# Patient Record
Sex: MALE | Race: ASIAN | NOT HISPANIC OR LATINO | URBAN - METROPOLITAN AREA
[De-identification: names, ages, dates, MRNs, and addresses within clinical notes are randomized per-mention and may not be internally consistent; named-entity substitution may affect disease eponyms.]

---

## 2017-01-15 ENCOUNTER — EMERGENCY (EMERGENCY)
Facility: HOSPITAL | Age: 37
LOS: 1 days | Discharge: HOME | End: 2017-01-15
Attending: EMERGENCY MEDICINE | Admitting: EMERGENCY MEDICINE
Payer: COMMERCIAL

## 2017-01-15 ENCOUNTER — FORM ENCOUNTER (OUTPATIENT)
Age: 37
End: 2017-01-15

## 2017-01-15 VITALS
DIASTOLIC BLOOD PRESSURE: 105 MMHG | SYSTOLIC BLOOD PRESSURE: 146 MMHG | TEMPERATURE: 98 F | OXYGEN SATURATION: 99 % | HEART RATE: 82 BPM | RESPIRATION RATE: 18 BRPM

## 2017-01-15 PROCEDURE — 99053 MED SERV 10PM-8AM 24 HR FAC: CPT

## 2017-01-15 PROCEDURE — 99283 EMERGENCY DEPT VISIT LOW MDM: CPT | Mod: 25

## 2017-01-15 RX ORDER — OXYCODONE HYDROCHLORIDE 5 MG/1
1 TABLET ORAL
Qty: 9 | Refills: 0 | OUTPATIENT
Start: 2017-01-15 | End: 2017-01-18

## 2017-01-15 NOTE — ED PROVIDER NOTE - PHYSICAL EXAMINATION
base of 2nd left toe, + tenderness, mild inflammation, no warmth, minimal erythema, full rom, + able to bear weight

## 2017-01-15 NOTE — ED PROVIDER NOTE - ATTENDING CONTRIBUTION TO CARE
mary ellen DR. SHARP, ATTENDING MD-  I performed a face to face bedside interview with patient regarding history of present illness, review of symptoms and past medical history. I completed an independent physical exam.  I have discussed patient's plan of care with PA.   Documentation as above in the note.   HPI: 37 yo M with Left foot pain at 2 MCP. Has had this before, worry for infection although denies trauma, open wound, fevers, chills, nausea, vomiting, erythema, tachycardia. Pt works as a  and wears sneakers to work. No work boots  EXAM: Left foot plantar without open wounds, no wounds in between toes. No erythema, no warmth, no streaking, no ecchymosis or other signs trauma. Pulses palpable in LLE.   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 Rec pt to wear proper work boots. Will give hard sole shoe here and send to f/u with Podiatry. Rx for pain meds now as needed. RICE therapy as well. Return for signs infection. pt requesting Abx for this condition but explained not indicated at this time but if worsening symptoms or systemic symptoms arise may return and will provide Abx if appropriate then.

## 2017-01-15 NOTE — ED PROVIDER NOTE - PMH
Primary angle-closure glaucoma, unspecified glaucoma stage, unspecified laterality, unspecified primary angle-closure glaucoma type

## 2017-01-15 NOTE — ED PROVIDER NOTE - OBJECTIVE STATEMENT
37 yo male with a PMH of glaucoma and left 2nd MTP fracture presents with complaints of 8/10 sharp pain, redness and swelling of and under his left 2nd MTP that began suddenly after shoveling snow 3 days ago. States he fractured his left 2nd MTP in 2013 after stepping on a nail. In 2015 he experienced similar symptoms as today, CT scan done by foot doctor revealed a stress fracture that improved with IV antibiotics. Pt has tried Epsom salt with mild relief and has not taken any pain medications. Flexing 1st and 2nd MTP of left foot worsens pain. Denies fevers, chills, n/v/d, chest pain, sob, abdominal pain, urinary symptoms, other arthralgias, numbness, any known triggers or any other acute complaints or abnormalities.Pt does not want any pain meds.

## 2017-01-18 ENCOUNTER — FORM ENCOUNTER (OUTPATIENT)
Age: 37
End: 2017-01-18

## 2017-01-19 ENCOUNTER — EMERGENCY (EMERGENCY)
Facility: HOSPITAL | Age: 37
LOS: 1 days | Discharge: HOME | End: 2017-01-19
Attending: EMERGENCY MEDICINE | Admitting: EMERGENCY MEDICINE
Payer: COMMERCIAL

## 2017-01-19 VITALS
OXYGEN SATURATION: 99 % | TEMPERATURE: 98 F | RESPIRATION RATE: 16 BRPM | DIASTOLIC BLOOD PRESSURE: 95 MMHG | HEART RATE: 75 BPM | SYSTOLIC BLOOD PRESSURE: 133 MMHG

## 2017-01-19 NOTE — ED ADULT TRIAGE NOTE - CHIEF COMPLAINT QUOTE
pt amb to triage c/o L foot pain, recent ED eval for same w/ pt d/c'd, VSS, instructions given, SL removed, ambulatory. DP to podiatry, went for MRI of L foot today, presents stating increased pain, unable to tolerate, as per pt told to come to ED for increased pain

## 2017-01-20 LAB
ALBUMIN SERPL ELPH-MCNC: 4.4 G/DL — SIGNIFICANT CHANGE UP (ref 3.3–5)
ALP SERPL-CCNC: 57 U/L — SIGNIFICANT CHANGE UP (ref 40–120)
ALT FLD-CCNC: 29 U/L — SIGNIFICANT CHANGE UP (ref 4–41)
AST SERPL-CCNC: 17 U/L — SIGNIFICANT CHANGE UP (ref 4–40)
BASOPHILS # BLD AUTO: 0.02 K/UL — SIGNIFICANT CHANGE UP (ref 0–0.2)
BASOPHILS NFR BLD AUTO: 0.2 % — SIGNIFICANT CHANGE UP (ref 0–2)
BILIRUB SERPL-MCNC: 0.2 MG/DL — SIGNIFICANT CHANGE UP (ref 0.2–1.2)
BUN SERPL-MCNC: 21 MG/DL — SIGNIFICANT CHANGE UP (ref 7–23)
CALCIUM SERPL-MCNC: 10 MG/DL — SIGNIFICANT CHANGE UP (ref 8.4–10.5)
CHLORIDE SERPL-SCNC: 101 MMOL/L — SIGNIFICANT CHANGE UP (ref 98–107)
CO2 SERPL-SCNC: 27 MMOL/L — SIGNIFICANT CHANGE UP (ref 22–31)
CREAT SERPL-MCNC: 1.05 MG/DL — SIGNIFICANT CHANGE UP (ref 0.5–1.3)
CRP SERPL-MCNC: 9.7 MG/L — HIGH (ref 0.3–5)
EOSINOPHIL # BLD AUTO: 0.28 K/UL — SIGNIFICANT CHANGE UP (ref 0–0.5)
EOSINOPHIL NFR BLD AUTO: 3.4 % — SIGNIFICANT CHANGE UP (ref 0–6)
ERYTHROCYTE [SEDIMENTATION RATE] IN BLOOD: 8 MM/HR — SIGNIFICANT CHANGE UP (ref 1–15)
GLUCOSE SERPL-MCNC: 104 MG/DL — HIGH (ref 70–99)
HBA1C BLD-MCNC: 5.9 % — HIGH (ref 4–5.6)
HCT VFR BLD CALC: 45.9 % — SIGNIFICANT CHANGE UP (ref 39–50)
HGB BLD-MCNC: 15.2 G/DL — SIGNIFICANT CHANGE UP (ref 13–17)
IMM GRANULOCYTES NFR BLD AUTO: 0.1 % — SIGNIFICANT CHANGE UP (ref 0–1.5)
LYMPHOCYTES # BLD AUTO: 2.42 K/UL — SIGNIFICANT CHANGE UP (ref 1–3.3)
LYMPHOCYTES # BLD AUTO: 29.3 % — SIGNIFICANT CHANGE UP (ref 13–44)
MCHC RBC-ENTMCNC: 28 PG — SIGNIFICANT CHANGE UP (ref 27–34)
MCHC RBC-ENTMCNC: 33.1 % — SIGNIFICANT CHANGE UP (ref 32–36)
MCV RBC AUTO: 84.5 FL — SIGNIFICANT CHANGE UP (ref 80–100)
MONOCYTES # BLD AUTO: 0.51 K/UL — SIGNIFICANT CHANGE UP (ref 0–0.9)
MONOCYTES NFR BLD AUTO: 6.2 % — SIGNIFICANT CHANGE UP (ref 2–14)
NEUTROPHILS # BLD AUTO: 5.02 K/UL — SIGNIFICANT CHANGE UP (ref 1.8–7.4)
NEUTROPHILS NFR BLD AUTO: 60.8 % — SIGNIFICANT CHANGE UP (ref 43–77)
PLATELET # BLD AUTO: 179 K/UL — SIGNIFICANT CHANGE UP (ref 150–400)
PMV BLD: 11.5 FL — SIGNIFICANT CHANGE UP (ref 7–13)
POTASSIUM SERPL-MCNC: 4.4 MMOL/L — SIGNIFICANT CHANGE UP (ref 3.5–5.3)
POTASSIUM SERPL-SCNC: 4.4 MMOL/L — SIGNIFICANT CHANGE UP (ref 3.5–5.3)
PROT SERPL-MCNC: 7.4 G/DL — SIGNIFICANT CHANGE UP (ref 6–8.3)
RBC # BLD: 5.43 M/UL — SIGNIFICANT CHANGE UP (ref 4.2–5.8)
RBC # FLD: 13.8 % — SIGNIFICANT CHANGE UP (ref 10.3–14.5)
SODIUM SERPL-SCNC: 141 MMOL/L — SIGNIFICANT CHANGE UP (ref 135–145)
WBC # BLD: 8.26 K/UL — SIGNIFICANT CHANGE UP (ref 3.8–10.5)
WBC # FLD AUTO: 8.26 K/UL — SIGNIFICANT CHANGE UP (ref 3.8–10.5)

## 2017-01-20 PROCEDURE — 76882 US LMTD JT/FCL EVL NVASC XTR: CPT | Mod: 26,LT

## 2017-01-20 PROCEDURE — 73719 MRI LOWER EXTREMITY W/DYE: CPT | Mod: 26,LT

## 2017-01-20 PROCEDURE — 73630 X-RAY EXAM OF FOOT: CPT | Mod: 26,LT

## 2017-01-20 PROCEDURE — 99220: CPT

## 2017-01-20 RX ORDER — IBUPROFEN 200 MG
800 TABLET ORAL EVERY 6 HOURS
Qty: 0 | Refills: 0 | Status: DISCONTINUED | OUTPATIENT
Start: 2017-01-20 | End: 2017-01-23

## 2017-01-20 RX ORDER — LATANOPROST 0.05 MG/ML
1 SOLUTION/ DROPS OPHTHALMIC; TOPICAL ONCE
Qty: 0 | Refills: 0 | Status: COMPLETED | OUTPATIENT
Start: 2017-01-20 | End: 2017-01-20

## 2017-01-20 RX ORDER — IBUPROFEN 200 MG
400 TABLET ORAL EVERY 6 HOURS
Qty: 0 | Refills: 0 | Status: DISCONTINUED | OUTPATIENT
Start: 2017-01-20 | End: 2017-01-20

## 2017-01-20 RX ORDER — LATANOPROST 0.05 MG/ML
1 SOLUTION/ DROPS OPHTHALMIC; TOPICAL AT BEDTIME
Qty: 0 | Refills: 0 | Status: DISCONTINUED | OUTPATIENT
Start: 2017-01-20 | End: 2017-01-23

## 2017-01-20 RX ORDER — IBUPROFEN 200 MG
400 TABLET ORAL ONCE
Qty: 0 | Refills: 0 | Status: COMPLETED | OUTPATIENT
Start: 2017-01-20 | End: 2017-01-20

## 2017-01-20 RX ADMIN — Medication 100 MILLIGRAM(S): at 10:07

## 2017-01-20 RX ADMIN — Medication 400 MILLIGRAM(S): at 07:45

## 2017-01-20 RX ADMIN — Medication 400 MILLIGRAM(S): at 03:46

## 2017-01-20 RX ADMIN — LATANOPROST 1 DROP(S): 0.05 SOLUTION/ DROPS OPHTHALMIC; TOPICAL at 03:28

## 2017-01-20 RX ADMIN — Medication 400 MILLIGRAM(S): at 02:52

## 2017-01-20 RX ADMIN — Medication 400 MILLIGRAM(S): at 12:28

## 2017-01-20 RX ADMIN — Medication 400 MILLIGRAM(S): at 19:28

## 2017-01-20 RX ADMIN — Medication 100 MILLIGRAM(S): at 02:53

## 2017-01-20 RX ADMIN — Medication 400 MILLIGRAM(S): at 13:00

## 2017-01-20 RX ADMIN — Medication 400 MILLIGRAM(S): at 08:30

## 2017-01-20 RX ADMIN — Medication 100 MILLIGRAM(S): at 18:32

## 2017-01-20 RX ADMIN — Medication 400 MILLIGRAM(S): at 18:32

## 2017-01-20 NOTE — ED PROVIDER NOTE - CONSTITUTIONAL, MLM
normal... Well appearing, well nourished, awake, alert, oriented to person, place, time/situation and in no apparent distress.  Pt. verbalizing in full clear effortless sentences.

## 2017-01-20 NOTE — ED CDU PROVIDER NOTE - OBJECTIVE STATEMENT
37 yo male with PMH of glaucoma (on latanoprost gtts), hyperlipidemia (no meds), and remote hx/o left foot infections (2011, and 2013); presents to the ED for acute atraumatic left foot pain / swelling / erythema x past 2 days.  Pt. states he was in the ED on 1/15/17, was evaluated and d/c'd with instructions to f/u with a podiatrist.  Pt. has been taking ibuprofen 400 mg prn (was initially helping) and followed up with podiatrist Dr. Miles Blackwell, who placed pt in a walking boot and sent pt for outpatient MRI.  Pt. had MRI 1/19/17, but in interim from seeing podiatrist to now, new erythema/swelling developed along with worsening pain; ibuprofen (last dose 1/19 @ 9 pm) now no longer helping for pain.  Ambulation causes increased pain.  Pt. denies fevers / chills / other leg pain / calf pain / hx/o trauma or injury / rash or puncture or wound to skin.  No other c/o. 37 yo male with PMH of glaucoma (on latanoprost gtts), hyperlipidemia (no meds), and remote hx/o left foot infections (2011, and 2013); presents to the ED for acute atraumatic left foot pain / swelling / erythema x past 2 days.  Pt. states he was in the ED on 1/15/17, was evaluated and d/c'd with instructions to f/u with a podiatrist.  Pt. has been taking ibuprofen 400 mg prn (was initially helping) and followed up with podiatrist Dr. Miles Blackwell, who placed pt in a walking boot and sent pt for outpatient MRI.  Pt. had MRI 1/19/17, but in interim from seeing podiatrist to now, new erythema/swelling developed along with worsening pain; ibuprofen (last dose 1/19 @ 9 pm) now no longer helping for pain.  Ambulation causes increased pain.  Pt. denies fevers / chills / other leg pain / calf pain / hx/o trauma or injury / rash or puncture or wound to skin.  No other c/o.  CDU plan discussed with pt; pt verbalizes agreement with plan.    Podiatrist:  Dr. Miles Blackwell  [phone]  (Outpatient MRI next door to podiatrist's office.)

## 2017-01-20 NOTE — ED PROVIDER NOTE - MUSCULOSKELETAL MINIMAL EXAM
atraumatic/Left foot dorsum with mild swelling to distal dorsum of foot (centrally present with most focal area of swelling just proximal to left 2nd - 3rd toes.  Pt has limited ROM to toes due to pain; +NVI.  Skin to left foot is intact; there is no rash / lesion / puncture / abrasion / wound.  There is no objectively noted area of fluctuance.  There is no crepitus or induration of the skin.  Cap refill is brisk; pulses 2+.  Remainder of LLE is nontender, and without edema / erythema / tenderness.  Bilateral lower extremities are otherwise without edema.

## 2017-01-20 NOTE — ED CDU PROVIDER NOTE - PHYSICAL EXAMINATION
(Of note, I am the CDU PA for the 1/19 - 1/20/17 overnight.  I assisted in Intake and saw this pt who was ideal for CDU as he required additional eval / tx.  ED Provider Note as authored by me was copied to CDU Note, as same provider eval.)

## 2017-01-20 NOTE — ED PROVIDER NOTE - MEDICAL DECISION MAKING DETAILS
35 yo male, with acute left foot pain / swelling / erythema, concern for cellulitis r/o abscess.  Podiatry to see pt; consult called.  Labs / xray / US left foot ordered / analgesia prn.

## 2017-01-20 NOTE — ED PROVIDER NOTE - PMH
Hyperlipidemia    Primary angle-closure glaucoma, unspecified glaucoma stage, unspecified laterality, unspecified primary angle-closure glaucoma type

## 2017-01-20 NOTE — ED ADULT NURSE NOTE - OBJECTIVE STATEMENT
Patient received in intake #1 c/o left foot pain. A&Ox3. Patient reports stepping on a nail with his left foot in 2013 and reports that it has not healed well since then. Patient c/o swelling and pain to left foot. 20G Saline lock to right ac. Will monitor.

## 2017-01-20 NOTE — ED CDU PROVIDER NOTE - INDICATION FOR OBSERVATION
Other/IV antibiotics / meds per orders, analgesia prn, MRI as ordered, observation and reassessment.

## 2017-01-20 NOTE — ED CDU PROVIDER NOTE - ATTENDING CONTRIBUTION TO CARE
Cho Dr. Amato: I performed a face to face bedside interview with patient regarding history of present illness, review of symptoms and past medical history. I completed an independent physical exam.  I have discussed patient's plan of care with PA.   I agree with note as stated above, having amended the EMR as needed to reflect my findings.   This includes HISTORY OF PRESENT ILLNESS, HIV, PAST MEDICAL/SURGICAL/FAMILY/SOCIAL HISTORY, ALLERGIES AND HOME MEDICATIONS, REVIEW OF SYSTEMS, PHYSICAL EXAM, and any PROGRESS NOTES during the time I functioned as the attending physician for this patient.  36M p/w atraumatic swelling to L foot, no fevers or chills, had h/o fx to foot in the past. On exam pt has erythema and warmth over dorsum of foot with no crepitus or ulcers. Plan - IV abx, MRI, podiatry consult.

## 2017-01-20 NOTE — ED CDU PROVIDER NOTE - PLAN OF CARE
Resolution of infection Follow up with your PMD within 3-5 days. Call for an appointment.  Bring copies of your ER lab reports with you to MD appointment. Also follow up with podiatry within 2-3 days.  Call for an appointment. Stay hydrated and get plenty of rest. take medications as prescribed. Take Motrin 600mg every 8 hours as needed for pain. Take Clindamycin 300mg every 8 hours until finished.  Return to ER if symptoms return or worsen, severe pain,  high fever, increased swelling or redness, difficulty ambulating or if other concerns arise. Of note your A1C level was elevated. This will need to be followed with your PMD. Avoid food high in sugar and carbohydrates. read the handouts provided.

## 2017-01-20 NOTE — ED PROVIDER NOTE - OBJECTIVE STATEMENT
35 yo male with PMH of glaucoma (on latanoprost gtts), hyperlipidemia (no meds), and remote hx/o left foot infections (2011, and 2013); presents to the ED for acute atraumatic left foot pain / swelling / erythema x past 2 days.  Pt. states he was in the ED on 1/15/17, was evaluated and d/c'd with instructions to f/u with a podiatrist.  Pt. has been taking ibuprofen 400 mg prn (was initially helping) and followed up with podiatrist Dr. Miles Blackwell, who placed pt in a walking boot and sent pt for outpatient MRI.  Pt. had MRI 1/19/17, but in interim from seeing podiatrist to now, new erythema/swelling developed along with worsening pain; ibuprofen (last dose 1/19 @ 9 pm) now no longer helping for pain.  Ambulation causes increased pain.  Pt. denies fevers / chills / other leg pain / calf pain / hx/o trauma or injury / rash or puncture or wound to skin.  No other c/o.

## 2017-01-20 NOTE — ED CDU PROVIDER NOTE - PROGRESS NOTE DETAILS
KARLEE Garnett Addendum----  Pt. states his pain is improving with oral analgesia provided in Intake area of ED. CDU PA Tamir Addendum----  Pt. resting comfortably in interim; no issues to date.  Pt. awaiting MRI; US and xray of left foot officially read by radiology; US showed no fluid collection; xray showed no evidence of osteomyelitis.  Pt. sleeping at present; will continue to monitor / reassess.  Pt. will be signed out to CDU day PA and attending at 0700 hrs. Dr. Amato: Pt doing well, however area of erythema over dorsum of midfoot (left) extending minimally beyond area of demarcation from yesterday. Pt denies fevers or chills. Normal active and passive ROM of ankle, septic arthritis unlikely. Will continue IV clinda, f/u MRI r/o osteo, f/u podiatry, reassess. CDU BREN Garnett Addendum------  This patient was signed out to me by CDU Brian Boyce and CDU attending Dr. Amato on 01/20/17 at 1900 hrs; test results reviewed.  In interim, pt has been resting comfortably; no complaints in interim apart from left foot pain (same areas as prior) after MRI; pt states he had his foot in an uncomfortable position during the test.  Area of edema does not appear much different from my last exam earlier today; erythema does appear somewhat improved.  Toes mobile and warm; remainder of LLE exam unremarkable; no calf TTP; +NVI.  Pt stable at present; will continue to monitor / reassess. CDU PA Garnett Addendum----  Pt. resting comfortably in interim; no issues to date; awaiting official MRI results; will continue to monitor / reassess.  Pt. will be signed out to CDU day PA and attending at 0700 hrs. CDU BREN Bryan -  Patient states he still has some pain in his foot. States he feels it is 60% better. Exam: heart- RRR s1 s2 nl Lungs - clear bilaterally  abd - soft NT ND  extrem- no cyanosis. left foot with swelling noted along dorsal aspect foot near second and third toes. swelling also seen of posterior foot below toes.  Labs: cbc - wbc 6.7 hgb 15.0 hct 44.8 plt 175  bmp - Na 139 K 4.3 cl 100 co2 25 BUN 22 creat 1.0 gluc 86 CRP 16.9   Plan for podiatry follow up this am. To continue with Clindamycin antibiotic, MRI completed overnight - await results. Continue to monitor. CDU DISCHARGE NOTE -  Attending : Patient had uneventful stay in CDU.   MRI completed.  L foot swelling and redness decreased.  Able to bear weight on it.   VSS:  Lungs CTA b/l, CV: RR , Ext: no pedal edema, Neuro AOx3 , nonfocal.  L foot dorsum with redness and swelling decreased from demarcated. Toes 2-3-4 mildly swollen and 3rd toe decr AROM.  Awaiting MRI report and podiatry reassessment.  Stable for discharge.  Labs and tests reviewed with the patient and copies provided. The patient is to follow up with their doctor as discussed.

## 2017-01-21 VITALS
SYSTOLIC BLOOD PRESSURE: 122 MMHG | OXYGEN SATURATION: 98 % | HEART RATE: 72 BPM | RESPIRATION RATE: 17 BRPM | DIASTOLIC BLOOD PRESSURE: 81 MMHG | TEMPERATURE: 98 F

## 2017-01-21 LAB
BASOPHILS # BLD AUTO: 0.03 K/UL — SIGNIFICANT CHANGE UP (ref 0–0.2)
BASOPHILS NFR BLD AUTO: 0.4 % — SIGNIFICANT CHANGE UP (ref 0–2)
BUN SERPL-MCNC: 22 MG/DL — SIGNIFICANT CHANGE UP (ref 7–23)
CALCIUM SERPL-MCNC: 9.4 MG/DL — SIGNIFICANT CHANGE UP (ref 8.4–10.5)
CHLORIDE SERPL-SCNC: 100 MMOL/L — SIGNIFICANT CHANGE UP (ref 98–107)
CO2 SERPL-SCNC: 25 MMOL/L — SIGNIFICANT CHANGE UP (ref 22–31)
CREAT SERPL-MCNC: 1.08 MG/DL — SIGNIFICANT CHANGE UP (ref 0.5–1.3)
CRP SERPL-MCNC: 16.9 MG/L — HIGH (ref 0.3–5)
EOSINOPHIL # BLD AUTO: 0.36 K/UL — SIGNIFICANT CHANGE UP (ref 0–0.5)
EOSINOPHIL NFR BLD AUTO: 5.3 % — SIGNIFICANT CHANGE UP (ref 0–6)
ERYTHROCYTE [SEDIMENTATION RATE] IN BLOOD: 9 MM/HR — SIGNIFICANT CHANGE UP (ref 1–15)
GLUCOSE SERPL-MCNC: 86 MG/DL — SIGNIFICANT CHANGE UP (ref 70–99)
HCT VFR BLD CALC: 44.8 % — SIGNIFICANT CHANGE UP (ref 39–50)
HGB BLD-MCNC: 15 G/DL — SIGNIFICANT CHANGE UP (ref 13–17)
IMM GRANULOCYTES NFR BLD AUTO: 0.1 % — SIGNIFICANT CHANGE UP (ref 0–1.5)
LYMPHOCYTES # BLD AUTO: 2.36 K/UL — SIGNIFICANT CHANGE UP (ref 1–3.3)
LYMPHOCYTES # BLD AUTO: 34.8 % — SIGNIFICANT CHANGE UP (ref 13–44)
MCHC RBC-ENTMCNC: 28 PG — SIGNIFICANT CHANGE UP (ref 27–34)
MCHC RBC-ENTMCNC: 33.5 % — SIGNIFICANT CHANGE UP (ref 32–36)
MCV RBC AUTO: 83.6 FL — SIGNIFICANT CHANGE UP (ref 80–100)
MONOCYTES # BLD AUTO: 0.55 K/UL — SIGNIFICANT CHANGE UP (ref 0–0.9)
MONOCYTES NFR BLD AUTO: 8.1 % — SIGNIFICANT CHANGE UP (ref 2–14)
NEUTROPHILS # BLD AUTO: 3.48 K/UL — SIGNIFICANT CHANGE UP (ref 1.8–7.4)
NEUTROPHILS NFR BLD AUTO: 51.3 % — SIGNIFICANT CHANGE UP (ref 43–77)
PLATELET # BLD AUTO: 175 K/UL — SIGNIFICANT CHANGE UP (ref 150–400)
PMV BLD: 11.4 FL — SIGNIFICANT CHANGE UP (ref 7–13)
POTASSIUM SERPL-MCNC: 4.3 MMOL/L — SIGNIFICANT CHANGE UP (ref 3.5–5.3)
POTASSIUM SERPL-SCNC: 4.3 MMOL/L — SIGNIFICANT CHANGE UP (ref 3.5–5.3)
RBC # BLD: 5.36 M/UL — SIGNIFICANT CHANGE UP (ref 4.2–5.8)
RBC # FLD: 13.6 % — SIGNIFICANT CHANGE UP (ref 10.3–14.5)
SODIUM SERPL-SCNC: 139 MMOL/L — SIGNIFICANT CHANGE UP (ref 135–145)
WBC # BLD: 6.79 K/UL — SIGNIFICANT CHANGE UP (ref 3.8–10.5)
WBC # FLD AUTO: 6.79 K/UL — SIGNIFICANT CHANGE UP (ref 3.8–10.5)

## 2017-01-21 PROCEDURE — 99217: CPT

## 2017-01-21 RX ORDER — IBUPROFEN 200 MG
1 TABLET ORAL
Qty: 45 | Refills: 0
Start: 2017-01-21 | End: 2017-02-05

## 2017-01-21 RX ADMIN — Medication 100 MILLIGRAM(S): at 02:05

## 2017-01-21 RX ADMIN — Medication 800 MILLIGRAM(S): at 06:13

## 2017-01-21 RX ADMIN — Medication 800 MILLIGRAM(S): at 00:16

## 2017-01-21 RX ADMIN — LATANOPROST 1 DROP(S): 0.05 SOLUTION/ DROPS OPHTHALMIC; TOPICAL at 00:15

## 2017-01-21 RX ADMIN — Medication 800 MILLIGRAM(S): at 01:53

## 2017-01-21 RX ADMIN — Medication 100 MILLIGRAM(S): at 10:00

## 2017-01-21 RX ADMIN — Medication 800 MILLIGRAM(S): at 11:19

## 2017-01-21 RX ADMIN — Medication 800 MILLIGRAM(S): at 08:13

## 2017-02-01 ENCOUNTER — FORM ENCOUNTER (OUTPATIENT)
Age: 37
End: 2017-02-01

## 2017-02-20 PROBLEM — H40.20X0 UNSPECIFIED PRIMARY ANGLE-CLOSURE GLAUCOMA, STAGE UNSPECIFIED: Chronic | Status: ACTIVE | Noted: 2017-01-15

## 2019-09-20 ENCOUNTER — EMERGENCY (EMERGENCY)
Facility: HOSPITAL | Age: 39
LOS: 1 days | Discharge: HOME | End: 2019-09-20
Admitting: EMERGENCY MEDICINE
Payer: MEDICAID

## 2019-09-20 VITALS — RESPIRATION RATE: 17 BRPM | TEMPERATURE: 98 F | HEART RATE: 74 BPM | OXYGEN SATURATION: 99 %

## 2019-09-20 PROCEDURE — 99282 EMERGENCY DEPT VISIT SF MDM: CPT

## 2019-09-20 NOTE — ED ADULT TRIAGE NOTE - CHIEF COMPLAINT QUOTE
Pt. w/ hx. sinus congestion  for 3x years states the medication he was prescribed is not working for him . Pt. states his sinuses get congested from September through May. Pt. appears in NAD in triage , even non-labored resp in triage

## 2019-09-20 NOTE — ED PROVIDER NOTE - OBJECTIVE STATEMENT
38 y/o male no pmh c/o sinus congestion x3 years. Pt admit sto using flonase, fluticasone spray, allegra D and claritin with little relief. Pt did not know what else to do so presented to the ER. Denies facial pain, headache, dizziness, neck pain, fever or chills.

## 2019-09-20 NOTE — ED PROVIDER NOTE - PATIENT PORTAL LINK FT
You can access the FollowMyHealth Patient Portal offered by [facility] by registering at the following website: [email or web address]. By joining [employer]’s FollowMyHealth portal, you will also be able to view your health information using other applications (apps) compatible with our system.

## 2019-09-21 PROBLEM — E78.5 HYPERLIPIDEMIA, UNSPECIFIED: Chronic | Status: ACTIVE | Noted: 2017-01-20

## 2020-08-30 ENCOUNTER — EMERGENCY (EMERGENCY)
Facility: HOSPITAL | Age: 40
LOS: 1 days | Discharge: HOME | End: 2020-08-30
Attending: EMERGENCY MEDICINE | Admitting: EMERGENCY MEDICINE
Payer: COMMERCIAL

## 2020-08-30 VITALS
DIASTOLIC BLOOD PRESSURE: 96 MMHG | HEART RATE: 70 BPM | OXYGEN SATURATION: 98 % | SYSTOLIC BLOOD PRESSURE: 134 MMHG | TEMPERATURE: 98 F | RESPIRATION RATE: 18 BRPM

## 2020-08-30 PROCEDURE — 99283 EMERGENCY DEPT VISIT LOW MDM: CPT

## 2020-08-30 RX ORDER — ACETAMINOPHEN 500 MG
975 TABLET ORAL ONCE
Refills: 0 | Status: COMPLETED | OUTPATIENT
Start: 2020-08-30 | End: 2020-08-30

## 2020-08-30 RX ADMIN — Medication 975 MILLIGRAM(S): at 23:39

## 2020-08-30 NOTE — ED ADULT TRIAGE NOTE - CHIEF COMPLAINT QUOTE
Pt c/o right and left knee pain x2 years. c/o swelling to left knee since Wednesday. Denies injury/trauma. Pt ambulatory. Pt currently on Amoxicillin being treated for sinus infection.

## 2020-08-31 PROCEDURE — 73562 X-RAY EXAM OF KNEE 3: CPT | Mod: 26,LT

## 2020-08-31 PROCEDURE — 73564 X-RAY EXAM KNEE 4 OR MORE: CPT | Mod: 26,LT

## 2020-08-31 NOTE — ED PROVIDER NOTE - OBJECTIVE STATEMENT
41 y/o male with pmhx of b/l knee pain presents to ED c/o left knee pain x 4 days. States he has had b/l knee pain for past 2 years on and off, associated with swelling that will go away on its own and better w/ ice. States his left knee has been swollen for 4 days, denies trauma or injury. Ambulatory. Intolerance to ibuprofen w/ vomiting, however states he takes Alleve at home with no issues and helps his pain. No fever, chills, redness, calf pain, weakness, numbness, tingling, cp, sob. 41 y/o male with pmhx of b/l knee pain presents to ED c/o left knee pain x 4 days. States he has had b/l knee pain for past 2 years on and off, associated with swelling that will go away on its own and better w/ ice. States his left knee has been swollen for 4 days, denies trauma or injury. Ambulatory. Intolerance to ibuprofen w/ vomiting, however states he takes Alleve at home with no issues and helps his pain. No fever, chills, redness, calf pain, weakness, numbness, tingling, cp, sob. Works in construction.

## 2020-08-31 NOTE — ED PROVIDER NOTE - PATIENT PORTAL LINK FT
You can access the FollowMyHealth Patient Portal offered by [facility] by registering at the following website: [email or web address]. By joining [employer]’s FollowMyHealth portal, you will also be able to view your health information using other applications (apps) compatible with our system.

## 2020-08-31 NOTE — ED PROVIDER NOTE - NSFOLLOWUPINSTRUCTIONS_ED_ALL_ED_FT
Rest, ice, elevate area.  Continue taking Alleve for pain.  Follow up with PMD within 48-72 hrs. Follow up with the Orthopedist doctor within the week to discuss possible MRI vs. Physical therapy- referral list given.        Any worsening pain, swelling, weakness, numbness or any NEW CONCERNING symptoms return to the Emergency Dept.

## 2020-08-31 NOTE — ED PROVIDER NOTE - ATTENDING CONTRIBUTION TO CARE
agree with PA note    "39 y/o male with pmhx of b/l knee pain presents to ED c/o left knee pain x 4 days. States he has had b/l knee pain for past 2 years on and off, associated with swelling that will go away on its own and better w/ ice. States his left knee has been swollen for 4 days, denies trauma or injury. Ambulatory. Intolerance to ibuprofen w/ vomiting, however states he takes Alleve at home with no issues and helps his pain. No fever, chills, redness, calf pain, weakness, numbness, tingling, cp, sob. Works in construction."    PE: well appearing; VSS: CTAB/L; s1 s2 no m/r/g abd soft/NT/ND ext: left knee small suprapatellar effusion; no warmth; FROM; no crepitus    Imp: joint effusion; pt has not tried anything; has appt with ortho in a week; no signs of septic joint; will recommend ACE wrap and NSAIDs;

## 2020-08-31 NOTE — ED PROVIDER NOTE - CLINICAL SUMMARY MEDICAL DECISION MAKING FREE TEXT BOX
41 y/o male with pmhx of b/l knee pain presents to ED c/o left knee pain x 4 days. States he has had b/l knee pain for past 2 years on and off, associated with swelling that will go away on its own and better w/ ice. States his left knee has been swollen for 4 days, denies trauma or injury. Ambulatory. pt with small joint effusion, full rom, afebrile, no skin changes or erythema. plan to check XR, pain control and refer to ortho and rheum outpatient. Pt has appointment with Orthopedics this week.

## 2020-08-31 NOTE — ED PROVIDER NOTE - MUSCULOSKELETAL MINIMAL EXAM
Left knee small joint effusion, full ROM, no increased warmth, no erythema/streaking, no bony tenderness. no calf tenderness. pulses 2+

## 2021-04-26 ENCOUNTER — FORM ENCOUNTER (OUTPATIENT)
Age: 41
End: 2021-04-26

## 2021-05-11 ENCOUNTER — FORM ENCOUNTER (OUTPATIENT)
Age: 41
End: 2021-05-11

## 2021-12-14 NOTE — ED ADULT TRIAGE NOTE - TEMPERATURE IN CELSIUS (DEGREES C)
Requested medication(s) are due for refill today: Yes  Patient has already received a courtesy refill: No  Other reason request has been forwarded to provider: 36.7

## 2022-07-24 ENCOUNTER — FORM ENCOUNTER (OUTPATIENT)
Age: 42
End: 2022-07-24

## 2022-08-23 ENCOUNTER — OUTPATIENT (OUTPATIENT)
Dept: OUTPATIENT SERVICES | Facility: HOSPITAL | Age: 42
LOS: 1 days | End: 2022-08-23
Payer: COMMERCIAL

## 2022-08-23 ENCOUNTER — RESULT REVIEW (OUTPATIENT)
Age: 42
End: 2022-08-23

## 2022-08-23 VITALS
RESPIRATION RATE: 16 BRPM | DIASTOLIC BLOOD PRESSURE: 75 MMHG | HEIGHT: 67 IN | TEMPERATURE: 98 F | SYSTOLIC BLOOD PRESSURE: 121 MMHG | WEIGHT: 190.04 LBS | HEART RATE: 67 BPM | OXYGEN SATURATION: 100 %

## 2022-08-23 DIAGNOSIS — Z01.818 ENCOUNTER FOR OTHER PREPROCEDURAL EXAMINATION: ICD-10-CM

## 2022-08-23 DIAGNOSIS — Z98.890 OTHER SPECIFIED POSTPROCEDURAL STATES: Chronic | ICD-10-CM

## 2022-08-23 DIAGNOSIS — M77.42 METATARSALGIA, LEFT FOOT: ICD-10-CM

## 2022-08-23 DIAGNOSIS — E78.5 HYPERLIPIDEMIA, UNSPECIFIED: ICD-10-CM

## 2022-08-23 PROCEDURE — 71046 X-RAY EXAM CHEST 2 VIEWS: CPT

## 2022-08-23 PROCEDURE — 71046 X-RAY EXAM CHEST 2 VIEWS: CPT | Mod: 26

## 2022-08-23 PROCEDURE — G0463: CPT

## 2022-08-23 RX ORDER — LATANOPROST 0.05 MG/ML
1 SOLUTION/ DROPS OPHTHALMIC; TOPICAL
Qty: 0 | Refills: 0 | DISCHARGE

## 2022-08-23 RX ORDER — IBUPROFEN 200 MG
0 TABLET ORAL
Qty: 0 | Refills: 0 | DISCHARGE

## 2022-08-23 NOTE — H&P PST ADULT - HISTORY OF PRESENT ILLNESS
41 y/o obese male with h/o chronic seasonal allergic rhinitis and HLD (atorvastatin) complains of left foot pain with walking long distances. States he had a nail injury to affected foot in 2013, and pain has been worsening since then. He is diagnosed with metatarsalgia, left foot and is scheduled for left foot 2nd digit Weil Osteotomy and digital exostectomy 8/26/22 43 y/o male with h/o chronic seasonal allergic rhinitis and HLD (atorvastatin) complains of left foot pain when walking long distances. States he had a nail injury to affected foot in 2013, and pain has been worsening since then. He is diagnosed with metatarsalgia, left foot and is scheduled for left foot 2nd digit Weil Osteotomy and digital exostectomy 8/26/22

## 2022-08-23 NOTE — H&P PST ADULT - RESPIRATORY
clear to auscultation bilaterally/no wheezes/no rales/no rhonchi/no respiratory distress/no use of accessory muscles/no subcutaneous emphysema/airway patent/good air movement/respirations non-labored

## 2022-08-23 NOTE — H&P PST ADULT - ATTENDING COMMENTS
Arthritic 2nd MPJ, discussed removal of arthritis and decompression to minimize pain. Awre of continued pain and need for more surgery such as implant or fusion

## 2022-08-23 NOTE — H&P PST ADULT - GASTROINTESTINAL
negative soft/nontender/nondistended/normal active bowel sounds/no guarding/no rigidity/no organomegaly/no palpable lionel/no masses palpable

## 2022-08-23 NOTE — H&P PST ADULT - ASSESSMENT
41 y/o obese male with h/o chronic seasonal allergic rhinitis and HLD (atorvastatin) is diagnosed with metatarsalgia, left foot   STOP BANG Score is 2 43 y/o male with h/o chronic seasonal allergic rhinitis (not on antihistamines) and HLD (atorvastatin) is diagnosed with metatarsalgia, left foot   STOP BANG Score is 2

## 2022-08-23 NOTE — H&P PST ADULT - NSANTHOSAYNRD_GEN_A_CORE
No. KIRTI screening performed.  STOP BANG Legend: 0-2 = LOW Risk; 3-4 = INTERMEDIATE Risk; 5-8 = HIGH Risk

## 2022-08-23 NOTE — H&P PST ADULT - NSICDXPASTMEDICALHX_GEN_ALL_CORE_FT
PAST MEDICAL HISTORY:  Hyperlipidemia     Primary angle-closure glaucoma, unspecified glaucoma stage, unspecified laterality, unspecified primary angle-closure glaucoma type

## 2022-08-23 NOTE — H&P PST ADULT - PROBLEM SELECTOR PLAN 2
Scheduled for left foot 2nd digit Weil Osteotomy and digital exostectomy 8/26/22  Preoperative instructions discussed and given to patient.   Instructed to schedule COVID 19 test 3 days prior to surgery.   Discussed preprocedure skin preparation using  chlorhexidine gluconate 4% solution three days prior to  surgery.  Instructed patient to avoid aspirin and aspirin products, over the counter medications such as vitamins and herbal medications, one week prior to surgery.  Take Tylenol as needed for pain  Patient verbalized understanding of instructions Scheduled for left foot 2nd digit Weil Osteotomy and digital exostectomy 8/26/22  Preoperative instructions discussed and given to patient.   Instructed to schedule COVID 19 test 3 days prior to surgery.   Discussed preprocedure skin preparation using  chlorhexidine gluconate 4% solution three days prior to  surgery.  Instructed patient to avoid aspirin and aspirin products, over the counter medications such as vitamins and herbal medications, one week prior to surgery.  Take Tylenol as needed for pain  Patient verbalized understanding of instructions  PST to obtain MC/labs/ekg from PCP  CXR here today

## 2022-08-23 NOTE — H&P PST ADULT - ALLERGY TYPES
cipro, advil/reactions to medicines cipro, advil/outdoor environmental allergies/reactions to medicines

## 2022-08-23 NOTE — H&P PST ADULT - NSICDXFAMILYHX_GEN_ALL_CORE_FT
FAMILY HISTORY:  Sibling  Still living? Unknown  Family history of glaucoma, Age at diagnosis: Age Unknown

## 2022-08-25 ENCOUNTER — TRANSCRIPTION ENCOUNTER (OUTPATIENT)
Age: 42
End: 2022-08-25

## 2022-08-26 ENCOUNTER — RESULT REVIEW (OUTPATIENT)
Age: 42
End: 2022-08-26

## 2022-08-26 ENCOUNTER — TRANSCRIPTION ENCOUNTER (OUTPATIENT)
Age: 42
End: 2022-08-26

## 2022-08-26 ENCOUNTER — APPOINTMENT (OUTPATIENT)
Dept: PODIATRY | Facility: HOSPITAL | Age: 42
End: 2022-08-26

## 2022-08-26 ENCOUNTER — OUTPATIENT (OUTPATIENT)
Dept: OUTPATIENT SERVICES | Facility: HOSPITAL | Age: 42
LOS: 1 days | End: 2022-08-26
Payer: COMMERCIAL

## 2022-08-26 VITALS
HEART RATE: 77 BPM | OXYGEN SATURATION: 99 % | SYSTOLIC BLOOD PRESSURE: 124 MMHG | RESPIRATION RATE: 17 BRPM | TEMPERATURE: 98 F | DIASTOLIC BLOOD PRESSURE: 84 MMHG

## 2022-08-26 VITALS
TEMPERATURE: 98 F | RESPIRATION RATE: 16 BRPM | OXYGEN SATURATION: 100 % | WEIGHT: 190.04 LBS | HEART RATE: 66 BPM | HEIGHT: 67 IN | DIASTOLIC BLOOD PRESSURE: 88 MMHG | SYSTOLIC BLOOD PRESSURE: 151 MMHG

## 2022-08-26 DIAGNOSIS — M10.9 GOUT, UNSPECIFIED: ICD-10-CM

## 2022-08-26 DIAGNOSIS — Z98.890 OTHER SPECIFIED POSTPROCEDURAL STATES: Chronic | ICD-10-CM

## 2022-08-26 DIAGNOSIS — Z01.818 ENCOUNTER FOR OTHER PREPROCEDURAL EXAMINATION: ICD-10-CM

## 2022-08-26 DIAGNOSIS — M77.42 METATARSALGIA, LEFT FOOT: ICD-10-CM

## 2022-08-26 PROBLEM — Z00.00 ENCOUNTER FOR PREVENTIVE HEALTH EXAMINATION: Status: ACTIVE | Noted: 2022-08-26

## 2022-08-26 PROCEDURE — C1713: CPT

## 2022-08-26 PROCEDURE — 73630 X-RAY EXAM OF FOOT: CPT | Mod: 26,LT

## 2022-08-26 PROCEDURE — 28108 REMOVAL OF TOE LESIONS: CPT | Mod: T1

## 2022-08-26 PROCEDURE — 88311 DECALCIFY TISSUE: CPT | Mod: 26

## 2022-08-26 PROCEDURE — 28308 INCISION OF METATARSAL: CPT | Mod: 59,LT

## 2022-08-26 PROCEDURE — 88304 TISSUE EXAM BY PATHOLOGIST: CPT | Mod: 26

## 2022-08-26 PROCEDURE — 73630 X-RAY EXAM OF FOOT: CPT

## 2022-08-26 PROCEDURE — 88311 DECALCIFY TISSUE: CPT

## 2022-08-26 PROCEDURE — 28308 INCISION OF METATARSAL: CPT | Mod: T1

## 2022-08-26 PROCEDURE — 97161 PT EVAL LOW COMPLEX 20 MIN: CPT

## 2022-08-26 PROCEDURE — 28124 PARTIAL REMOVAL OF TOE: CPT | Mod: LT

## 2022-08-26 PROCEDURE — 88304 TISSUE EXAM BY PATHOLOGIST: CPT

## 2022-08-26 DEVICE — SCREW TWIST OFF 20X12MM: Type: IMPLANTABLE DEVICE | Status: FUNCTIONAL

## 2022-08-26 DEVICE — K-WIRE MICROAIRE (SMOOTH) DOUBLE TROCAR 1.6MM X 4": Type: IMPLANTABLE DEVICE | Status: FUNCTIONAL

## 2022-08-26 RX ORDER — OXYCODONE AND ACETAMINOPHEN 5; 325 MG/1; MG/1
5-325 TABLET ORAL
Qty: 16 | Refills: 0 | Status: ACTIVE | COMMUNITY
Start: 2022-08-26 | End: 1900-01-01

## 2022-08-26 RX ORDER — SODIUM CHLORIDE 9 MG/ML
1000 INJECTION, SOLUTION INTRAVENOUS
Refills: 0 | Status: DISCONTINUED | OUTPATIENT
Start: 2022-08-26 | End: 2022-08-26

## 2022-08-26 RX ORDER — ATORVASTATIN CALCIUM 80 MG/1
1 TABLET, FILM COATED ORAL
Qty: 0 | Refills: 0 | DISCHARGE

## 2022-08-26 RX ORDER — HYDROMORPHONE HYDROCHLORIDE 2 MG/ML
0.5 INJECTION INTRAMUSCULAR; INTRAVENOUS; SUBCUTANEOUS
Refills: 0 | Status: DISCONTINUED | OUTPATIENT
Start: 2022-08-26 | End: 2022-08-26

## 2022-08-26 RX ORDER — SODIUM CHLORIDE 9 MG/ML
3 INJECTION INTRAMUSCULAR; INTRAVENOUS; SUBCUTANEOUS EVERY 8 HOURS
Refills: 0 | Status: DISCONTINUED | OUTPATIENT
Start: 2022-08-26 | End: 2022-08-26

## 2022-08-26 RX ORDER — ONDANSETRON 8 MG/1
4 TABLET, FILM COATED ORAL ONCE
Refills: 0 | Status: DISCONTINUED | OUTPATIENT
Start: 2022-08-26 | End: 2022-08-26

## 2022-08-26 RX ORDER — ACETAMINOPHEN 500 MG
2 TABLET ORAL
Qty: 0 | Refills: 0 | DISCHARGE

## 2022-08-26 RX ADMIN — HYDROMORPHONE HYDROCHLORIDE 0.5 MILLIGRAM(S): 2 INJECTION INTRAMUSCULAR; INTRAVENOUS; SUBCUTANEOUS at 09:24

## 2022-08-26 RX ADMIN — HYDROMORPHONE HYDROCHLORIDE 0.5 MILLIGRAM(S): 2 INJECTION INTRAMUSCULAR; INTRAVENOUS; SUBCUTANEOUS at 09:46

## 2022-08-26 NOTE — BRIEF OPERATIVE NOTE - NSICDXBRIEFPROCEDURE_GEN_ALL_CORE_FT
PROCEDURES:  Weil osteotomy of left foot 26-Aug-2022 08:52:59 2nd metatarsal Anant Barba  Exostectomy of toe 26-Aug-2022 08:53:20 Left 2nd digit Anant Barba

## 2022-08-26 NOTE — BRIEF OPERATIVE NOTE - NSICDXBRIEFPOSTOP_GEN_ALL_CORE_FT
POST-OP DIAGNOSIS:  Metatarsalgia of left foot 26-Aug-2022 08:57:37  Anant Barba  Arthritis of foot 26-Aug-2022 08:57:51  Anant Barba

## 2022-08-26 NOTE — ASU DISCHARGE PLAN (ADULT/PEDIATRIC) - B. DRINK ALCOHOL, BEER, OR WINE
"GENERAL SURGICAL CONSULTATION    I was requested by Mckeznie Lucero MD and Dr. Vito Marmolejo, to consult on this pt to evaluate them for a polyp at the appendix.    HPI:  This is a 68 y.o. female here today getting workup from anemia.  She had a colonoscopy which identified a polyp at the base of the appendix.  The pt was sent to me to have this unresected lesion surgically removed.       Allergies:Penicillins    PHx:  DVT in the Right leg,  Pt has been off coumadin for 1 year.  Some SOB      History reviewed. No pertinent surgical history.    CURRENT MEDS:  No current outpatient prescriptions on file.     No current facility-administered medications for this visit.        History reviewed. No pertinent family history.  Family history is not pertinent to this patients Chief Complaint.     reports that she has quit smoking. She has never used smokeless tobacco. She reports that she drinks alcohol. She reports that she does not use illicit drugs.    Review of Systems -   10 point Review of systems is negative except for; as mentioned above in HPI and PMHx    /62 (Patient Site: Right Arm, Patient Position: Sitting, Cuff Size: Adult Large)  Pulse 96  Ht 5' 7.5\" (1.715 m)  Wt (!) 284 lb 14.4 oz (129.2 kg)  SpO2 98%  Breastfeeding? No  BMI 43.96 kg/m2  Body mass index is 43.96 kg/(m^2).    EXAM:  GENERAL: Obese female  HEENT: EOMI, Anicteric Sclera, Moist Mucous Membranes,  In Mouth the pt does not have redness or bleeding gums  CARDIOVASCULAR: RRR w/out murmur   CHEST/LUNG: Clear to Auscultation  ABDOMEN:  Non tender to palpation, +BS  MUSCULOSKELETAL:  No deformities with good range of motion in all extremities  NEURO: She is ambulatory with good strength in both legs.  HEME/LYMPH: No Cervical Adenopathy or tenderness.     IMAGES:  None    Assessment/Plan:  Polyp at the Appendiceal Orifice.     *  Laparoscopic Appendectomy      Anthony Gao MD  [facility]  [phone]    " Statement Selected

## 2022-08-26 NOTE — ASU DISCHARGE PLAN (ADULT/PEDIATRIC) - CARE PROVIDER_API CALL
Silver, Miles M (DPM)  Foot Surgery; Recon RearfootAnkle Surgery  [address] [place] [address]  Phone: [phone]  Fax: [phone]  Established Patient  Follow Up Time: 1 week

## 2022-08-26 NOTE — ASU DISCHARGE PLAN (ADULT/PEDIATRIC) - NS MD DC FALL RISK RISK
For information on Fall & Injury Prevention, visit: [email or web address] OR  [email or web address] OR  https://www.cdc.gov/steadi/patient.html

## 2022-08-26 NOTE — BRIEF OPERATIVE NOTE - NSICDXBRIEFPREOP_GEN_ALL_CORE_FT
PRE-OP DIAGNOSIS:  Metatarsalgia, left foot 26-Aug-2022 08:55:45  Anant Barba  Arthritis of foot 26-Aug-2022 08:57:18  Anant Barba

## 2022-08-26 NOTE — PHYSICAL THERAPY INITIAL EVALUATION ADULT - GENERAL OBSERVATIONS, REHAB EVAL
42y male patient, spouse presence, asu referral, s/p foot surgery, safe crutch gait training lle sx shoes and hwb

## 2022-08-29 DIAGNOSIS — M72.2 PLANTAR FASCIAL FIBROMATOSIS: ICD-10-CM

## 2022-08-29 DIAGNOSIS — S93.692A OTHER SPRAIN OF LEFT FOOT, INITIAL ENCOUNTER: ICD-10-CM

## 2022-08-29 DIAGNOSIS — Z88.9 ALLERGY STATUS TO UNSPECIFIED DRUGS, MEDICAMENTS AND BIOLOGICAL SUBSTANCES: ICD-10-CM

## 2022-08-29 DIAGNOSIS — E78.00 PURE HYPERCHOLESTEROLEMIA, UNSPECIFIED: ICD-10-CM

## 2022-08-29 DIAGNOSIS — S92.515D NONDISPLACED FRACTURE OF PROXIMAL PHALANX OF LEFT LESSER TOE(S), SUBSEQUENT ENCOUNTER FOR FRACTURE WITH ROUTINE HEALING: ICD-10-CM

## 2022-08-29 DIAGNOSIS — Z87.891 PERSONAL HISTORY OF NICOTINE DEPENDENCE: ICD-10-CM

## 2022-08-29 DIAGNOSIS — S92.515A NONDISPLACED FRACTURE OF PROXIMAL PHALANX OF LEFT LESSER TOE(S), INITIAL ENCOUNTER FOR CLOSED FRACTURE: ICD-10-CM

## 2022-08-29 DIAGNOSIS — M77.32 CALCANEAL SPUR, LEFT FOOT: ICD-10-CM

## 2022-08-29 RX ORDER — TAMSULOSIN HYDROCHLORIDE 0.4 MG/1
0.4 CAPSULE ORAL
Refills: 0 | Status: ACTIVE | COMMUNITY

## 2022-08-29 RX ORDER — LIDOCAINE AND PRILOCAINE 25; 25 MG/G; MG/G
2.5-2.5 CREAM TOPICAL
Refills: 0 | Status: ACTIVE | COMMUNITY

## 2022-08-29 RX ORDER — NAPROXEN SODIUM 550 MG/1
550 TABLET ORAL
Refills: 0 | Status: ACTIVE | COMMUNITY

## 2022-08-29 RX ORDER — ONDANSETRON 8 MG/1
8 TABLET, ORALLY DISINTEGRATING ORAL
Refills: 0 | Status: ACTIVE | COMMUNITY

## 2022-08-31 ENCOUNTER — APPOINTMENT (OUTPATIENT)
Dept: PODIATRY | Facility: CLINIC | Age: 42
End: 2022-08-31

## 2022-08-31 VITALS — BODY MASS INDEX: 28.79 KG/M2 | HEIGHT: 68 IN | WEIGHT: 190 LBS

## 2022-08-31 DIAGNOSIS — M77.42 METATARSALGIA, LEFT FOOT: ICD-10-CM

## 2022-08-31 DIAGNOSIS — M12.572 TRAUMATIC ARTHROPATHY, LEFT ANKLE AND FOOT: ICD-10-CM

## 2022-08-31 LAB — SURGICAL PATHOLOGY STUDY: SIGNIFICANT CHANGE UP

## 2022-08-31 PROCEDURE — 99024 POSTOP FOLLOW-UP VISIT: CPT

## 2022-09-02 PROBLEM — M77.42 METATARSALGIA OF LEFT FOOT: Status: ACTIVE | Noted: 2022-08-29

## 2022-09-02 PROBLEM — M12.572 TRAUMATIC ARTHRITIS OF LEFT ANKLE: Status: ACTIVE | Noted: 2022-08-29

## 2022-09-07 NOTE — ASU PATIENT PROFILE, ADULT - WILL THE PATIENT ACCEPT THE PFIZER COVID-19 VACCINE IF ELIGIBLE AND IT IS AVAILABLE?
Pt on home eliquis for history of DVTs.  - c/w eliquis 2.5 mg PO BID Pt on home bumex 2mg. Most recent TTE in Feb 2020 showing EF 55-60%.  - holding bumex in the setting of sepsis  - TTE pending No

## 2022-09-08 ENCOUNTER — APPOINTMENT (OUTPATIENT)
Dept: PODIATRY | Facility: CLINIC | Age: 42
End: 2022-09-08

## 2022-09-08 VITALS — HEIGHT: 68 IN | WEIGHT: 191 LBS | BODY MASS INDEX: 28.95 KG/M2

## 2022-09-08 PROCEDURE — 99212 OFFICE O/P EST SF 10 MIN: CPT | Mod: 24

## 2022-09-08 NOTE — HISTORY OF PRESENT ILLNESS
[Post-op Sandals] : post-op sandals [FreeTextEntry1] : Patient presents today for evaluation S/P surgery for the left 2nd MPJ. It is about 4 days postop. The pain is mild. He did have an arthritic 2nd MPJ, Weil osteotomy was performed and there is also gouty crystal formation noted. I have him on Colchicine.

## 2022-09-08 NOTE — PHYSICAL EXAM
[2+] : left foot dorsalis pedis 2+ [Sensation] : the sensory exam was normal to light touch and pinprick [No Focal Deficits] : no focal deficits [Deep Tendon Reflexes (DTR)] : deep tendon reflexes were 2+ and symmetric [Motor Exam] : the motor exam was normal [Delayed in the Right Toes] : capillary refills normal in right toes [Delayed in the Left Toes] : capillary refills normal in the left toes [FreeTextEntry1] : The incision is well coapted. There is no erythema, drainage or infection. There is mild swelling.

## 2022-09-08 NOTE — ASSESSMENT
[FreeTextEntry1] : \par Treatment: I put a sterile surgical dressing on. He will continue the Colchicine. I wrote for uric acid levels. I put him into a fracture boot. He will follow-up next week for suture removal.

## 2022-09-08 NOTE — PROCEDURE
[FreeTextEntry1] : X-ray Report: X-rays demonstrate good positioning of internal fixation and cleanup of spurring with decompression of the 2nd MPJ.

## 2022-09-14 NOTE — ASSESSMENT
[FreeTextEntry1] : \par Treatment: His pathology came back consistent with gout and kermit noted. I sent the report to his internist who he is going to see tomorrow and will check his uric acid levels. I want him to follow a gout diet for the next 2 weeks. He will continue the CAM boot. I will see him back in about 10 days and get follow-up x-rays.

## 2022-09-14 NOTE — HISTORY OF PRESENT ILLNESS
[CAM Boot] : a CAM boot [FreeTextEntry1] : Patient presents today for evaluation and care of 2nd ray surgery. He is doing much better. His pain is now down to 3/10. He has been using the CAM boot.\par

## 2022-09-14 NOTE — PHYSICAL EXAM
[de-identified] : There is good motion. He is progressing nicely. [FreeTextEntry1] : Incision is well coapted. Minimal swelling.

## 2022-09-22 ENCOUNTER — APPOINTMENT (OUTPATIENT)
Dept: PODIATRY | Facility: CLINIC | Age: 42
End: 2022-09-22

## 2022-09-22 VITALS — BODY MASS INDEX: 28.95 KG/M2 | WEIGHT: 191 LBS | HEIGHT: 68 IN

## 2022-09-22 DIAGNOSIS — M79.673 PAIN IN UNSPECIFIED FOOT: ICD-10-CM

## 2022-09-22 PROCEDURE — 73630 X-RAY EXAM OF FOOT: CPT | Mod: LT

## 2022-09-22 PROCEDURE — 99212 OFFICE O/P EST SF 10 MIN: CPT | Mod: 25

## 2022-09-27 PROBLEM — M79.673 PAIN, FOOT: Status: ACTIVE | Noted: 2022-09-23

## 2022-09-27 NOTE — ASSESSMENT
[FreeTextEntry1] : \par Impression:  Postop. Weil osteotomy\par \par Treatment: I want him to continue the fracture boot for another 2 weeks. He could go to work. I referred him for rheumatology for his gout and I want him to continue Colcrys once a day for the next 3 weeks. I want him to follow a strict gout diet for another 3 weeks as well. Statement Selected

## 2022-09-27 NOTE — PHYSICAL EXAM
[2+] : left foot dorsalis pedis 2+ [Sensation] : the sensory exam was normal to light touch and pinprick [No Focal Deficits] : no focal deficits [Deep Tendon Reflexes (DTR)] : deep tendon reflexes were 2+ and symmetric [Motor Exam] : the motor exam was normal [Delayed in the Right Toes] : capillary refills normal in right toes [Delayed in the Left Toes] : capillary refills normal in the left toes [de-identified] : The pain at the osteotomy is pretty much resolved. There is clean motion. He is progressing nicely. [FreeTextEntry1] : Very well coapted incision. Minimal swelling.

## 2022-09-27 NOTE — PROCEDURE
[FreeTextEntry1] :  X-ray Report: (Left foot - 3 views) I got x-rays that demonstrate good signs of osteogenesis, healing and repositioning.

## 2022-09-27 NOTE — HISTORY OF PRESENT ILLNESS
[CAM Boot] : a CAM boot [FreeTextEntry1] : Patient presents today 4 weeks S/P Weil osteotomy. He is doing quite well. He is really progressing nicely. He is back at work and using the CAM boot. He did got for his uric acid levels and it is elevated at 8.3.\par

## 2022-09-28 ENCOUNTER — APPOINTMENT (OUTPATIENT)
Dept: RHEUMATOLOGY | Facility: CLINIC | Age: 42
End: 2022-09-28

## 2022-09-28 VITALS
OXYGEN SATURATION: 99 % | SYSTOLIC BLOOD PRESSURE: 121 MMHG | HEART RATE: 79 BPM | DIASTOLIC BLOOD PRESSURE: 79 MMHG | WEIGHT: 190 LBS | HEIGHT: 68 IN | RESPIRATION RATE: 16 BRPM | TEMPERATURE: 97.2 F | BODY MASS INDEX: 28.79 KG/M2

## 2022-09-28 PROCEDURE — 99205 OFFICE O/P NEW HI 60 MIN: CPT

## 2022-09-29 LAB
ALBUMIN SERPL ELPH-MCNC: 4.8 G/DL
ALP BLD-CCNC: 67 U/L
ALT SERPL-CCNC: 34 U/L
ANION GAP SERPL CALC-SCNC: 12 MMOL/L
AST SERPL-CCNC: 23 U/L
BASOPHILS # BLD AUTO: 0.04 K/UL
BASOPHILS NFR BLD AUTO: 0.6 %
BILIRUB SERPL-MCNC: 0.4 MG/DL
BUN SERPL-MCNC: 18 MG/DL
CALCIUM SERPL-MCNC: 9.7 MG/DL
CHLORIDE SERPL-SCNC: 104 MMOL/L
CO2 SERPL-SCNC: 26 MMOL/L
CREAT SERPL-MCNC: 1.18 MG/DL
EGFR: 80 ML/MIN/1.73M2
EOSINOPHIL # BLD AUTO: 0.25 K/UL
EOSINOPHIL NFR BLD AUTO: 3.8 %
HCT VFR BLD CALC: 47.6 %
HGB BLD-MCNC: 15.7 G/DL
IMM GRANULOCYTES NFR BLD AUTO: 0 %
LYMPHOCYTES # BLD AUTO: 1.73 K/UL
LYMPHOCYTES NFR BLD AUTO: 26.6 %
MAN DIFF?: NORMAL
MCHC RBC-ENTMCNC: 27.8 PG
MCHC RBC-ENTMCNC: 33 GM/DL
MCV RBC AUTO: 84.2 FL
MONOCYTES # BLD AUTO: 0.54 K/UL
MONOCYTES NFR BLD AUTO: 8.3 %
NEUTROPHILS # BLD AUTO: 3.95 K/UL
NEUTROPHILS NFR BLD AUTO: 60.7 %
PLATELET # BLD AUTO: 213 K/UL
POTASSIUM SERPL-SCNC: 4.5 MMOL/L
PROT SERPL-MCNC: 7.4 G/DL
RBC # BLD: 5.65 M/UL
RBC # FLD: 12.9 %
SODIUM SERPL-SCNC: 142 MMOL/L
URATE SERPL-MCNC: 9.1 MG/DL
WBC # FLD AUTO: 6.51 K/UL

## 2022-10-13 ENCOUNTER — APPOINTMENT (OUTPATIENT)
Dept: PODIATRY | Facility: CLINIC | Age: 42
End: 2022-10-13
Payer: COMMERCIAL

## 2022-10-13 VITALS — HEIGHT: 68 IN | BODY MASS INDEX: 28.79 KG/M2 | WEIGHT: 190 LBS

## 2022-10-13 DIAGNOSIS — M10.9 GOUT, UNSPECIFIED: ICD-10-CM

## 2022-10-13 PROCEDURE — 99213 OFFICE O/P EST LOW 20 MIN: CPT | Mod: 25

## 2022-10-13 PROCEDURE — 73620 X-RAY EXAM OF FOOT: CPT | Mod: LT

## 2022-10-13 PROCEDURE — 99024 POSTOP FOLLOW-UP VISIT: CPT | Mod: 25

## 2022-10-14 PROBLEM — M10.9 ACUTE GOUT OF FOOT, UNSPECIFIED CAUSE, UNSPECIFIED LATERALITY: Status: ACTIVE | Noted: 2022-09-02

## 2022-10-17 NOTE — HISTORY OF PRESENT ILLNESS
[CAM Boot] : a CAM boot [FreeTextEntry1] : Patient presents today S/P Weil osteotomy. He is progressing nicely and dramatic improvement is noted. He is taking Allopurinol as per rheumatology and he is still in the CAM boot. He is just short of 2 months.

## 2022-10-17 NOTE — PHYSICAL EXAM
[2+] : left foot dorsalis pedis 2+ [Sensation] : the sensory exam was normal to light touch and pinprick [No Focal Deficits] : no focal deficits [Deep Tendon Reflexes (DTR)] : deep tendon reflexes were 2+ and symmetric [Motor Exam] : the motor exam was normal [Delayed in the Right Toes] : capillary refills normal in right toes [Delayed in the Left Toes] : capillary refills normal in the left toes [FreeTextEntry3] : CFT: Normal. [de-identified] : There is no pain with palpation over the osteotomy site. He is dramatically improved.  [FreeTextEntry1] : Very well coapted incision. The swelling is very minimal at this point.

## 2022-10-17 NOTE — PROCEDURE
[FreeTextEntry1] : \par X-ray Report: (Left foot - 2 views) X-rays demonstrate excellent signs of osteogenesis, healing and repositioning.

## 2022-10-17 NOTE — ASSESSMENT
[FreeTextEntry1] : \par Treatment: He will continue the CAM boot one more week and then go into good stability boots with Spenco arch supports. He will use cross-friction massage and gradually increase activity. He is discharged from treatment. Follow-up in the office only as as needed. Continue the gout diet over the next month.

## 2022-10-26 ENCOUNTER — APPOINTMENT (OUTPATIENT)
Dept: RHEUMATOLOGY | Facility: CLINIC | Age: 42
End: 2022-10-26

## 2022-10-26 VITALS
BODY MASS INDEX: 28.79 KG/M2 | TEMPERATURE: 97.5 F | HEART RATE: 69 BPM | RESPIRATION RATE: 16 BRPM | WEIGHT: 190 LBS | HEIGHT: 68 IN | OXYGEN SATURATION: 98 % | SYSTOLIC BLOOD PRESSURE: 121 MMHG | DIASTOLIC BLOOD PRESSURE: 83 MMHG

## 2022-10-26 DIAGNOSIS — M1A.9XX1 CHRONIC GOUT, UNSPECIFIED, WITH TOPHUS (TOPHI): ICD-10-CM

## 2022-10-26 PROCEDURE — 99213 OFFICE O/P EST LOW 20 MIN: CPT

## 2022-10-27 ENCOUNTER — NON-APPOINTMENT (OUTPATIENT)
Age: 42
End: 2022-10-27

## 2022-10-27 LAB
ALBUMIN SERPL ELPH-MCNC: 4.8 G/DL
ALP BLD-CCNC: 69 U/L
ALT SERPL-CCNC: 52 U/L
ANION GAP SERPL CALC-SCNC: 14 MMOL/L
AST SERPL-CCNC: 54 U/L
BASOPHILS # BLD AUTO: 0.05 K/UL
BASOPHILS NFR BLD AUTO: 0.7 %
BILIRUB SERPL-MCNC: 0.6 MG/DL
BUN SERPL-MCNC: 10 MG/DL
CALCIUM SERPL-MCNC: 9.7 MG/DL
CHLORIDE SERPL-SCNC: 104 MMOL/L
CO2 SERPL-SCNC: 24 MMOL/L
CREAT SERPL-MCNC: 1.06 MG/DL
EGFR: 90 ML/MIN/1.73M2
EOSINOPHIL # BLD AUTO: 0.3 K/UL
EOSINOPHIL NFR BLD AUTO: 4.5 %
HCT VFR BLD CALC: 45.3 %
HGB BLD-MCNC: 14.9 G/DL
IMM GRANULOCYTES NFR BLD AUTO: 0.1 %
LYMPHOCYTES # BLD AUTO: 1.75 K/UL
LYMPHOCYTES NFR BLD AUTO: 26.1 %
MAN DIFF?: NORMAL
MCHC RBC-ENTMCNC: 27.9 PG
MCHC RBC-ENTMCNC: 32.9 GM/DL
MCV RBC AUTO: 84.7 FL
MONOCYTES # BLD AUTO: 0.54 K/UL
MONOCYTES NFR BLD AUTO: 8 %
NEUTROPHILS # BLD AUTO: 4.06 K/UL
NEUTROPHILS NFR BLD AUTO: 60.6 %
PLATELET # BLD AUTO: 210 K/UL
POTASSIUM SERPL-SCNC: 4.4 MMOL/L
PROT SERPL-MCNC: 7.5 G/DL
RBC # BLD: 5.35 M/UL
RBC # FLD: 13 %
SODIUM SERPL-SCNC: 142 MMOL/L
URATE SERPL-MCNC: 7.6 MG/DL
WBC # FLD AUTO: 6.71 K/UL

## 2022-11-07 LAB
ALBUMIN SERPL ELPH-MCNC: 4.7 G/DL
ALP BLD-CCNC: 68 U/L
ALT SERPL-CCNC: 50 U/L
AST SERPL-CCNC: 41 U/L
BILIRUB DIRECT SERPL-MCNC: 0.1 MG/DL
BILIRUB INDIRECT SERPL-MCNC: 0.3 MG/DL
BILIRUB SERPL-MCNC: 0.4 MG/DL
GGT SERPL-CCNC: 25 U/L
PROT SERPL-MCNC: 7 G/DL

## 2022-11-11 LAB
ALBUMIN SERPL ELPH-MCNC: 4.9 G/DL
ALP BLD-CCNC: 65 U/L
ALT SERPL-CCNC: 58 U/L
AST SERPL-CCNC: 72 U/L
BILIRUB DIRECT SERPL-MCNC: 0.2 MG/DL
BILIRUB INDIRECT SERPL-MCNC: 0.4 MG/DL
BILIRUB SERPL-MCNC: 0.5 MG/DL
PROT SERPL-MCNC: 7.2 G/DL
URATE SERPL-MCNC: 6.7 MG/DL

## 2022-11-13 LAB — CK SERPL-CCNC: 4120 U/L

## 2022-11-15 ENCOUNTER — NON-APPOINTMENT (OUTPATIENT)
Age: 42
End: 2022-11-15

## 2022-12-23 ENCOUNTER — NON-APPOINTMENT (OUTPATIENT)
Age: 42
End: 2022-12-23

## 2022-12-23 ENCOUNTER — APPOINTMENT (OUTPATIENT)
Dept: RHEUMATOLOGY | Facility: CLINIC | Age: 42
End: 2022-12-23

## 2022-12-23 LAB
ALBUMIN SERPL ELPH-MCNC: 4.8 G/DL
ALP BLD-CCNC: 59 U/L
ALT SERPL-CCNC: 68 U/L
AST SERPL-CCNC: 41 U/L
BILIRUB DIRECT SERPL-MCNC: 0.2 MG/DL
BILIRUB INDIRECT SERPL-MCNC: 0.3 MG/DL
BILIRUB SERPL-MCNC: 0.5 MG/DL
CK SERPL-CCNC: 596 U/L
GGT SERPL-CCNC: 24 U/L
PROT SERPL-MCNC: 7.1 G/DL

## 2023-01-27 ENCOUNTER — APPOINTMENT (OUTPATIENT)
Dept: RHEUMATOLOGY | Facility: CLINIC | Age: 43
End: 2023-01-27
Payer: COMMERCIAL

## 2023-01-27 VITALS
SYSTOLIC BLOOD PRESSURE: 121 MMHG | BODY MASS INDEX: 28.49 KG/M2 | WEIGHT: 188 LBS | DIASTOLIC BLOOD PRESSURE: 86 MMHG | OXYGEN SATURATION: 98 % | TEMPERATURE: 98.2 F | HEART RATE: 68 BPM | HEIGHT: 68 IN

## 2023-01-27 PROCEDURE — 99213 OFFICE O/P EST LOW 20 MIN: CPT

## 2023-01-28 ENCOUNTER — NON-APPOINTMENT (OUTPATIENT)
Age: 43
End: 2023-01-28

## 2023-01-28 LAB — CK SERPL-CCNC: ABNORMAL U/L

## 2023-01-30 LAB
ALBUMIN SERPL ELPH-MCNC: 4.6 G/DL
ALP BLD-CCNC: 58 U/L
ALT SERPL-CCNC: 68 U/L
ANION GAP SERPL CALC-SCNC: 11 MMOL/L
AST SERPL-CCNC: 155 U/L
BASOPHILS # BLD AUTO: 0.03 K/UL
BASOPHILS NFR BLD AUTO: 0.5 %
BILIRUB SERPL-MCNC: 0.5 MG/DL
BUN SERPL-MCNC: 16 MG/DL
CALCIUM SERPL-MCNC: 9.8 MG/DL
CHLORIDE SERPL-SCNC: 102 MMOL/L
CO2 SERPL-SCNC: 27 MMOL/L
CREAT SERPL-MCNC: 1.08 MG/DL
EGFR: 88 ML/MIN/1.73M2
EOSINOPHIL # BLD AUTO: 0.18 K/UL
EOSINOPHIL NFR BLD AUTO: 2.8 %
HCT VFR BLD CALC: 46.7 %
HGB BLD-MCNC: 15.5 G/DL
IMM GRANULOCYTES NFR BLD AUTO: 0.3 %
LYMPHOCYTES # BLD AUTO: 1.77 K/UL
LYMPHOCYTES NFR BLD AUTO: 27.3 %
MAN DIFF?: NORMAL
MCHC RBC-ENTMCNC: 28.1 PG
MCHC RBC-ENTMCNC: 33.2 GM/DL
MCV RBC AUTO: 84.6 FL
MONOCYTES # BLD AUTO: 0.38 K/UL
MONOCYTES NFR BLD AUTO: 5.9 %
NEUTROPHILS # BLD AUTO: 4.11 K/UL
NEUTROPHILS NFR BLD AUTO: 63.2 %
PLATELET # BLD AUTO: 202 K/UL
POTASSIUM SERPL-SCNC: 4.4 MMOL/L
PROT SERPL-MCNC: 7.4 G/DL
RBC # BLD: 5.52 M/UL
RBC # FLD: 13.8 %
SODIUM SERPL-SCNC: 140 MMOL/L
URATE SERPL-MCNC: 9.2 MG/DL
WBC # FLD AUTO: 6.49 K/UL

## 2023-01-30 NOTE — HISTORY OF PRESENT ILLNESS
[FreeTextEntry1] : was taking atorvastatin and stopped due to abnormal LFTs and CPK elevation \par off allopurinol and colchicine as well \par a few times had gout flare since stopping medication \par now not using alcohol and cut out soft drinks and sugar \par eats small to moderate amount of meat\par now off atorvastatin as well as ULT\par

## 2023-01-30 NOTE — ASSESSMENT
[FreeTextEntry1] : check labs as outlined below to get a new baseline prior to resuming ULT \par repeat CPK. CPK is presumed to be evaluated due to atorvastatin \par patient to call for test results\par  English

## 2023-02-01 DIAGNOSIS — R74.8 ABNORMAL LEVELS OF OTHER SERUM ENZYMES: ICD-10-CM

## 2023-02-01 LAB
ALBUMIN SERPL ELPH-MCNC: 4.9 G/DL
ALP BLD-CCNC: 62 U/L
ALT SERPL-CCNC: 54 U/L
ANION GAP SERPL CALC-SCNC: 15 MMOL/L
AST SERPL-CCNC: 49 U/L
BILIRUB SERPL-MCNC: 0.4 MG/DL
BUN SERPL-MCNC: 15 MG/DL
CALCIUM SERPL-MCNC: 10.3 MG/DL
CHLORIDE SERPL-SCNC: 102 MMOL/L
CK SERPL-CCNC: 1467 U/L
CO2 SERPL-SCNC: 25 MMOL/L
CREAT SERPL-MCNC: 1.09 MG/DL
EGFR: 87 ML/MIN/1.73M2
POTASSIUM SERPL-SCNC: 4.6 MMOL/L
PROT SERPL-MCNC: 7.4 G/DL
SODIUM SERPL-SCNC: 141 MMOL/L
URATE SERPL-MCNC: 8.7 MG/DL

## 2023-02-07 LAB
ALBUMIN SERPL ELPH-MCNC: 4.9 G/DL
ALP BLD-CCNC: 62 U/L
ALT SERPL-CCNC: 39 U/L
AST SERPL-CCNC: 25 U/L
BILIRUB DIRECT SERPL-MCNC: 0.1 MG/DL
BILIRUB INDIRECT SERPL-MCNC: 0.3 MG/DL
BILIRUB SERPL-MCNC: 0.4 MG/DL
CK SERPL-CCNC: 263 U/L
PROT SERPL-MCNC: 7.6 G/DL

## 2023-03-07 NOTE — ASU DISCHARGE PLAN (ADULT/PEDIATRIC) - WILL THE PATIENT ACCEPT THE PFIZER COVID-19 VACCINE IF ELIGIBLE AND IT IS AVAILABLE?
[address] [facility]                                             [address] [facility], [address] [place] [address], [address] [place] [address]        Phone: [phone]      Name:  Yasmin Hutchinson  :    1956    Consultants:   Patient Care Team:  Tad Lei DO as PCP - General (Family Medicine)  Ambika Shelley MD (Gastroenterology)    Chief Complaint:     Yasmin Hutchinson is a 73 y.o. female  who presents today for an established patient care visit with Personalized Prevention Plan Services as noted below. HPI:     Arabella Felix is a 73year old female with past medical history of prediabetes, depression, obesity, migraine, fibromyalgia, GERD who presents for follow-up of the following conditions:    HTN: Patient states that she has been recently stressed over the past couple days regarding her son's hospitalization. She notes that she had chest pain that occurred 2 days ago and took 81 mg of aspirin at that time. She also states that she has had frontal headache as well as some vision changes over the past 2 days. Denies any changes in urination or shortness of breath. She believes that all of her symptoms are related to stress. She does not check her blood pressure at home and is unsure if she still has a blood pressure cuff at home. Patient denies dysarthria, facial drooping, confusion, balance issues. Prediabetes: Patient's last A1c on 2023 was 5.9. This is relatively unchanged from her last A1c noted in care everywhere on 2018 of 6.0. Hyperlipidemia: Last lipid panel showed , , HDL 56, . Patient is currently not on medical therapy. Depression: Patient has a history of depression secondary to the loss of her  about 2 years ago. She states her mood has since improved and is no longer requiring medical management. No suicidal thoughts reported.   Patient states she has a good support system. Patient Active Problem List   Diagnosis    TERENCE (generalized anxiety disorder)    Mixed hyperlipidemia    Plantar fasciitis, bilateral    Migraine with aura and without status migrainosus, not intractable    Prediabetes    Fibromyalgia    Carpal tunnel syndrome of right wrist    Gastroesophageal reflux disease without esophagitis    Class 1 obesity due to excess calories without serious comorbidity with body mass index (BMI) of 34.0 to 34.9 in adult    Major depressive disorder with single episode, in full remission [facility])         Past Medical History:    Past Medical History:   Diagnosis Date    Arthritis     Depression     Hyperlipidemia     Thyroid disease        Past Surgical History:  Past Surgical History:   Procedure Laterality Date    BREAST LUMPECTOMY      CHOLECYSTECTOMY      COLONOSCOPY  03/06/2017    polyps    HYSTERECTOMY (CERVIX STATUS UNKNOWN)         Home Meds:  Prior to Visit Medications    Medication Sig Taking? Authorizing Provider   Omega-3 Fatty Acids (FISH OIL) 1000 MG capsule Take by mouth daily Yes Historical Provider, MD   Multiple Vitamins-Minerals (ONE-A-DAY WOMENS VITACRAVES PO) Take by mouth Yes Historical Provider, MD   acetaminophen (TYLENOL) 500 MG tablet Take 500 mg by mouth every 6 hours as needed for Pain Yes Historical Provider, MD   aspirin 81 MG tablet Take 81 mg by mouth daily. Yes Historical Provider, MD       Allergies:    Patient has no known allergies.     Family History:       Problem Relation Age of Onset    Lung Cancer Mother     Diabetes Father     Other Sister         possible colon cancer, pt unsure         Health Maintenance Completed:  Health Maintenance   Topic Date Due    Pneumococcal 65+ years Vaccine (1 - PCV) Never done    HIV screen  Never done    Hepatitis C screen  Never done    Colorectal Cancer Screen  Never done    Breast cancer screen  Never done    Shingles vaccine (1 of 2) Never done    DEXA (modify frequency per FRAX score)  Never done    DTaP/Tdap/Td vaccine (2 - Td or Tdap) 06/22/2021    COVID-19 Vaccine (4 - Booster for Pfizer series) 12/21/2021    Flu vaccine (1) 08/01/2022    A1C test (Diabetic or Prediabetic)  02/21/2024    Depression Monitoring  03/08/2024    Lipids  02/21/2028    Hepatitis A vaccine  Aged Out    Hib vaccine  Aged Out    Meningococcal (ACWY) vaccine  Aged Out          Immunization History   Administered Date(s) Administered    COVID-19, PFIZER PURPLE top, DILUTE for use, (age 15 y+), 30mcg/0.3mL 12/21/2020, 01/11/2021, 10/26/2021    Influenza Virus Vaccine 11/13/2018    Tdap (Boostrix, Adacel) 06/22/2011         Review of Systems:  Review of Systems   Eyes:  Positive for visual disturbance. Respiratory:  Negative for shortness of breath. Cardiovascular:  Positive for chest pain. Gastrointestinal:  Negative for abdominal pain, diarrhea, nausea and vomiting. Genitourinary:  Negative for difficulty urinating. Neurological:  Positive for headaches. Negative for dizziness and light-headedness. Physical Exam:   Vitals:    03/08/23 1055 03/08/23 1141   BP: (!) 170/84 (!) 142/90   Pulse: 75    Temp: 98.3 °F (36.8 °C)    SpO2: 98%    Weight: 187 lb 3.2 oz (84.9 kg)    Height: 5' 2\" (1.575 m)      Body mass index is 34.24 kg/m². Wt Readings from Last 3 Encounters:   03/08/23 187 lb 3.2 oz (84.9 kg)   02/21/23 186 lb (84.4 kg)   01/24/23 190 lb 3.2 oz (86.3 kg)       BP Readings from Last 3 Encounters:   03/08/23 (!) 142/90   02/21/23 136/74   01/24/23 126/70       Physical Exam  Constitutional:       General: She is not in acute distress. Appearance: Normal appearance. She is obese. Cardiovascular:      Rate and Rhythm: Normal rate and regular rhythm. Heart sounds: Normal heart sounds. Pulmonary:      Effort: Pulmonary effort is normal.      Breath sounds: Normal breath sounds. Neurological:      General: No focal deficit present.       Mental Status: She is alert and oriented to person, place, and time. Mental status is at baseline.       Comments: NIHSS: 0            Lab Review:   Lab Results   Component Value Date/Time     02/21/2023 04:45 PM    K 3.8 02/21/2023 04:45 PM     02/21/2023 04:45 PM    CO2 23 02/21/2023 04:45 PM    BUN 8 02/21/2023 04:45 PM    CREATININE 0.8 02/21/2023 04:45 PM    GLUCOSE 87 02/21/2023 04:45 PM    CALCIUM 10.0 02/21/2023 04:45 PM     Lab Results   Component Value Date/Time    WBC 9.9 02/21/2023 04:45 PM    HGB 14.6 02/21/2023 04:45 PM    HCT 43.3 02/21/2023 04:45 PM    MCV 89.7 02/21/2023 04:45 PM     02/21/2023 04:45 PM     Lab Results   Component Value Date/Time    CHOL 292 02/21/2023 04:45 PM    TRIG 288 02/21/2023 04:45 PM    HDL 56 02/21/2023 04:45 PM          Assessment/Plan:  Bessy Carter is a 73year old female with past medical history of prediabetes, depression, obesity, migraine, fibromyalgia, GERD who presents for follow-up of the following conditions:    Diagnoses and all orders for this visit:    Hypertensive emergency  - BP today 170/84, repeat 142/90  -Given patient's concurrent chest pain, headache, vision changes over the past couple days I have a strong suspicion for hypertensive emergency  -NIH SS: 0  - I strongly urged patient to go to the emergency room for further cardiac evaluation given her blood pressure and symptoms  -Patient refused to go to the emergency room and stated that she needed to go see her son in the hospital  -Offered in office EKG with outpatient labs of troponin and other basic lab work, which again patient refused secondary to potential cost of the studies  -Discussed my concern of current risks with uncontrolled BP and signs of end organ damage and further development of potential CVA, MI, death  -Patient voiced understanding of my concern and still elected to not go further with these test or ED visit  -Given aspirin 325 mg chewable tablet in office today  -Discussed return precautions including development of dysarthria, feelings of being off balance, confusion, facial drooping, recurrent chest pain, further changes in headache quality or vision changes  -RTC in 1 to 2 days to follow-up symptoms    Mixed hyperlipidemia  -Uncontrolled  - last lipid panel 2/21/23 , , HDL 56,   - The 10-year ASCVD risk score (Uma DK, et al., 2019) is: 8.2%  - Will discuss USPSTF v. ACC guidelines for starting statin therapy at next visit  - RTC in 1-2 days to discuss medical management    Prediabetes  - Uncontrolled  - A1c 2/21/23: 5.9  - Previously on metformin, but patient opted to discontinue this medication  - RTC in 1-2 days to discuss lifestyle modification for prediabetes management    Class 1 obesity due to excess calories without serious comorbidity with body mass index (BMI) of 34.0 to 34.9 in adult  - Uncontrolled  - BMI 34.24  - Recommend 150 minutes of moderate intensity physical activity per week  - RTC in 1-2 days to discuss lifestyle modifications    Major depressive disorder with single episode, [facility]  - Controlled  - PHQ 9: 1; TERENCE 7: 1  - RTC PRN for worsening symptoms    Health Maintenance Due:  Health Maintenance Due   Topic Date Due    Pneumococcal 65+ years Vaccine (1 - PCV) Never done    HIV screen  Never done    Hepatitis C screen  Never done    Colorectal Cancer Screen  Never done    Breast cancer screen  Never done    Shingles vaccine (1 of 2) Never done    DEXA (modify frequency per FRAX score)  Never done    DTaP/Tdap/Td vaccine (2 - Td or Tdap) 06/22/2021    COVID-19 Vaccine (4 - Booster for Pfizer series) 12/21/2021    Flu vaccine (1) 08/01/2022          Health care decision maker:  up to date:  already done      Health Maintenance: (USPSTF Recommendations)  (F) Breast Cancer Screen: (40-49 (C), 50-74 biennial screening mammogram (B))  (F) Cervical Cancer Screen: (21-29 q3yr cytology alone; 30-65 q3yr cytology alone, q5yr with hrHPV alone, or q5yr cytology+hrHPV (A))  (M) Prostate Cancer Screen: (54-77 yo discuss benefits/harm, does not recommend testing PSA in men >73 yo (D):   (M) AAA Screen: (men 73-67 yo who has ever smoked (B), consider in nonsmokers if high risk):  CRC/Colonoscopy Screening: (adults 39-53 (B), 50-75 (A))  Lung Ca Screening: Annual LDCT (+smoker age 49-80, smoked within 15 years, total of 20 pack yr history (B)):  DEXA Screen: (women >65 and older, <65 if at risk/postmenopausal (B))  HIV Screen: (16-65 yr old, and all pregnant patients (A)): Hep C Screen: (18-79 yr old (B)):  HCC Screen: (all pts with cirrhosis and high risk Hep B (US q6 mo)):  Immunizations:    RTC:  Return in about 1 day (around 3/9/2023). EMR Dragon/transcription disclaimer:  Much of this encounter note is electronic transcription/translation of spoken language to printed texts. The electronic translation of spoken language may be erroneous, or at times, nonsensical words or phrases may be inadvertently transcribed.   Although I have reviewed the note for such errors, some may still exist. Not applicable

## 2023-03-14 NOTE — ED PROVIDER NOTE - CIGARETTES, PACKS/DAY
Subjective   Heath Russell is a 15 y.o. male who presents for Sore Throat.  Today he is accompanied by accompanied by mother.     HPI  ST off and on for a month, intermittent congestion. Brother has strep. No fever.     Objective   Temp 35.7 °C (96.2 °F) (Tympanic)   Wt 63.1 kg     Growth percentiles: No height on file for this encounter. 75 %ile (Z= 0.67) based on [facility] (Boys, 2-20 Years) weight-for-age data using vitals from 3/14/2023.     Physical Exam  Constitutional:       Appearance: Normal appearance.   HENT:      Right Ear: Tympanic membrane normal.      Left Ear: Tympanic membrane normal.      Nose: Nose normal.      Mouth/Throat:      Mouth: Mucous membranes are moist.      Pharynx: Oropharynx is clear. Posterior oropharyngeal erythema present.      Tonsils: 0 on the right. 0 on the left.   Eyes:      Conjunctiva/sclera: Conjunctivae normal.   Cardiovascular:      Rate and Rhythm: Normal rate and regular rhythm.      Heart sounds: No murmur heard.  Pulmonary:      Effort: Pulmonary effort is normal.      Breath sounds: Normal breath sounds.   Abdominal:      Palpations: Abdomen is soft.   Musculoskeletal:      Cervical back: Neck supple.   Lymphadenopathy:      Cervical: Cervical adenopathy present.   Skin:     General: Skin is warm and dry.      Findings: No rash.   Neurological:      Mental Status: He is alert. Mental status is at baseline.     No splenomegaly    Assessment/Plan   Diagnoses and all orders for this visit:  Sore throat  -     POCT rapid strep A manually resulted  -     Group A Streptococcus, PCR           1

## 2023-04-04 LAB
ALBUMIN SERPL ELPH-MCNC: 5.1 G/DL
ALP BLD-CCNC: 62 U/L
ALT SERPL-CCNC: 33 U/L
ANION GAP SERPL CALC-SCNC: 13 MMOL/L
AST SERPL-CCNC: 25 U/L
BASOPHILS # BLD AUTO: 0.03 K/UL
BASOPHILS NFR BLD AUTO: 0.5 %
BILIRUB SERPL-MCNC: 0.4 MG/DL
BUN SERPL-MCNC: 17 MG/DL
CALCIUM SERPL-MCNC: 10.2 MG/DL
CHLORIDE SERPL-SCNC: 104 MMOL/L
CO2 SERPL-SCNC: 25 MMOL/L
CREAT SERPL-MCNC: 1.17 MG/DL
EGFR: 80 ML/MIN/1.73M2
EOSINOPHIL # BLD AUTO: 0.14 K/UL
EOSINOPHIL NFR BLD AUTO: 2.4 %
HCT VFR BLD CALC: 48.7 %
HGB BLD-MCNC: 16.1 G/DL
IMM GRANULOCYTES NFR BLD AUTO: 0.2 %
LYMPHOCYTES # BLD AUTO: 1.45 K/UL
LYMPHOCYTES NFR BLD AUTO: 24.7 %
MAN DIFF?: NORMAL
MCHC RBC-ENTMCNC: 27.9 PG
MCHC RBC-ENTMCNC: 33.1 GM/DL
MCV RBC AUTO: 84.3 FL
MONOCYTES # BLD AUTO: 0.42 K/UL
MONOCYTES NFR BLD AUTO: 7.2 %
NEUTROPHILS # BLD AUTO: 3.81 K/UL
NEUTROPHILS NFR BLD AUTO: 65 %
PLATELET # BLD AUTO: 210 K/UL
POTASSIUM SERPL-SCNC: 4.4 MMOL/L
PROT SERPL-MCNC: 7.7 G/DL
RBC # BLD: 5.78 M/UL
RBC # FLD: 13.2 %
SODIUM SERPL-SCNC: 142 MMOL/L
URATE SERPL-MCNC: 8.8 MG/DL
WBC # FLD AUTO: 5.86 K/UL

## 2023-04-24 ENCOUNTER — NON-APPOINTMENT (OUTPATIENT)
Age: 43
End: 2023-04-24

## 2023-04-24 LAB
ALBUMIN SERPL ELPH-MCNC: 5.1 G/DL
ALP BLD-CCNC: 60 U/L
ALT SERPL-CCNC: 34 U/L
ANION GAP SERPL CALC-SCNC: 12 MMOL/L
AST SERPL-CCNC: 29 U/L
BASOPHILS # BLD AUTO: 0.04 K/UL
BASOPHILS NFR BLD AUTO: 0.7 %
BILIRUB SERPL-MCNC: 0.5 MG/DL
BUN SERPL-MCNC: 15 MG/DL
CALCIUM SERPL-MCNC: 10 MG/DL
CHLORIDE SERPL-SCNC: 104 MMOL/L
CO2 SERPL-SCNC: 26 MMOL/L
CREAT SERPL-MCNC: 1.09 MG/DL
EGFR: 87 ML/MIN/1.73M2
EOSINOPHIL # BLD AUTO: 0.15 K/UL
EOSINOPHIL NFR BLD AUTO: 2.5 %
HCT VFR BLD CALC: 47.8 %
HGB BLD-MCNC: 16 G/DL
IMM GRANULOCYTES NFR BLD AUTO: 0 %
LYMPHOCYTES # BLD AUTO: 1.47 K/UL
LYMPHOCYTES NFR BLD AUTO: 24.3 %
MAN DIFF?: NORMAL
MCHC RBC-ENTMCNC: 28.2 PG
MCHC RBC-ENTMCNC: 33.5 GM/DL
MCV RBC AUTO: 84.3 FL
MONOCYTES # BLD AUTO: 0.36 K/UL
MONOCYTES NFR BLD AUTO: 5.9 %
NEUTROPHILS # BLD AUTO: 4.04 K/UL
NEUTROPHILS NFR BLD AUTO: 66.6 %
PLATELET # BLD AUTO: 207 K/UL
POTASSIUM SERPL-SCNC: 4.7 MMOL/L
PROT SERPL-MCNC: 7.4 G/DL
RBC # BLD: 5.67 M/UL
RBC # FLD: 13.5 %
SODIUM SERPL-SCNC: 141 MMOL/L
URATE SERPL-MCNC: 8.8 MG/DL
WBC # FLD AUTO: 6.06 K/UL

## 2023-05-22 ENCOUNTER — APPOINTMENT (OUTPATIENT)
Dept: RHEUMATOLOGY | Facility: CLINIC | Age: 43
End: 2023-05-22
Payer: COMMERCIAL

## 2023-05-22 VITALS
BODY MASS INDEX: 28.04 KG/M2 | WEIGHT: 185 LBS | TEMPERATURE: 97.1 F | SYSTOLIC BLOOD PRESSURE: 124 MMHG | HEART RATE: 68 BPM | OXYGEN SATURATION: 98 % | DIASTOLIC BLOOD PRESSURE: 76 MMHG | HEIGHT: 68 IN | RESPIRATION RATE: 16 BRPM

## 2023-05-22 LAB
ALBUMIN SERPL ELPH-MCNC: 4.9 G/DL
ALP BLD-CCNC: 58 U/L
ALT SERPL-CCNC: 31 U/L
ANION GAP SERPL CALC-SCNC: 13 MMOL/L
AST SERPL-CCNC: 23 U/L
BILIRUB SERPL-MCNC: 0.3 MG/DL
BUN SERPL-MCNC: 18 MG/DL
CALCIUM SERPL-MCNC: 10.3 MG/DL
CHLORIDE SERPL-SCNC: 104 MMOL/L
CO2 SERPL-SCNC: 25 MMOL/L
CREAT SERPL-MCNC: 1.03 MG/DL
EGFR: 93 ML/MIN/1.73M2
POTASSIUM SERPL-SCNC: 4.9 MMOL/L
PROT SERPL-MCNC: 7.3 G/DL
SODIUM SERPL-SCNC: 142 MMOL/L
URATE SERPL-MCNC: 6.6 MG/DL

## 2023-05-22 PROCEDURE — 99213 OFFICE O/P EST LOW 20 MIN: CPT

## 2023-05-22 NOTE — HISTORY OF PRESENT ILLNESS
[FreeTextEntry1] : c/o swelling in the left foot but no pain \par slight pain when eat foods that he is not suppose to \par he is taking allopurinol 200 mg daily and colchicine daily \par work out by using light weight only now \par no myalgia\par

## 2023-05-22 NOTE — PHYSICAL EXAM
[General Appearance - Alert] : alert [General Appearance - In No Acute Distress] : in no acute distress [FreeTextEntry1] : no synovitis [Oriented To Time, Place, And Person] : oriented to person, place, and time [Impaired Insight] : insight and judgment were intact [Affect] : the affect was normal

## 2023-05-22 NOTE — ASSESSMENT
[FreeTextEntry1] : uric acid level is not at goal \par check labs as outlined below and adjust allopurinol accordingly \par f/u 2 months, sooner PRN

## 2023-05-31 ENCOUNTER — NON-APPOINTMENT (OUTPATIENT)
Age: 43
End: 2023-05-31

## 2023-07-28 ENCOUNTER — APPOINTMENT (OUTPATIENT)
Dept: RHEUMATOLOGY | Facility: CLINIC | Age: 43
End: 2023-07-28
Payer: COMMERCIAL

## 2023-07-28 VITALS
HEART RATE: 102 BPM | TEMPERATURE: 98.4 F | WEIGHT: 185 LBS | HEIGHT: 68 IN | OXYGEN SATURATION: 98 % | BODY MASS INDEX: 28.04 KG/M2 | DIASTOLIC BLOOD PRESSURE: 85 MMHG | SYSTOLIC BLOOD PRESSURE: 140 MMHG

## 2023-07-28 PROCEDURE — 99213 OFFICE O/P EST LOW 20 MIN: CPT

## 2023-07-28 RX ORDER — ALLOPURINOL 100 MG/1
100 TABLET ORAL DAILY
Qty: 30 | Refills: 1 | Status: ACTIVE | COMMUNITY
Start: 2022-10-27 | End: 1900-01-01

## 2023-08-05 NOTE — ASSESSMENT
[FreeTextEntry1] : there is no active gout on exam at present  continue with present regimen of allopurinol and colchicine  increase allopurinol dose by 100 mg daily  check labs in one month  OV 3 months, sooner PRN

## 2023-08-05 NOTE — PHYSICAL EXAM
[General Appearance - Alert] : alert [General Appearance - In No Acute Distress] : in no acute distress [Full Pulse] : the pedal pulses are present [Edema] : there was no peripheral edema [FreeTextEntry1] : mild tenderness of the left 1st MTP joint but there is no erythema or synovitis;

## 2023-08-05 NOTE — HISTORY OF PRESENT ILLNESS
[FreeTextEntry1] : Patient presents for follow-up visit today.  He is doing well and offers no complaints.   last uric acid level is not at goal  LFTS wnl  tolerating allopurinol well  continues to have intermittent pain int h 1st MTP joint of the left foot, no swelling/erythema.  started to do light exercises again.

## 2023-08-25 ENCOUNTER — APPOINTMENT (OUTPATIENT)
Dept: RHEUMATOLOGY | Facility: CLINIC | Age: 43
End: 2023-08-25
Payer: COMMERCIAL

## 2023-08-25 VITALS
OXYGEN SATURATION: 98 % | SYSTOLIC BLOOD PRESSURE: 118 MMHG | WEIGHT: 187 LBS | HEART RATE: 67 BPM | DIASTOLIC BLOOD PRESSURE: 81 MMHG | HEIGHT: 68 IN | BODY MASS INDEX: 28.34 KG/M2

## 2023-08-25 DIAGNOSIS — R74.8 ABNORMAL LEVELS OF OTHER SERUM ENZYMES: ICD-10-CM

## 2023-08-25 LAB
ALBUMIN SERPL ELPH-MCNC: 4.6 G/DL
ALP BLD-CCNC: 66 U/L
ALT SERPL-CCNC: 31 U/L
ANION GAP SERPL CALC-SCNC: 10 MMOL/L
AST SERPL-CCNC: 26 U/L
BILIRUB SERPL-MCNC: 0.2 MG/DL
BUN SERPL-MCNC: 20 MG/DL
CALCIUM SERPL-MCNC: 10.1 MG/DL
CHLORIDE SERPL-SCNC: 106 MMOL/L
CK SERPL-CCNC: 471 U/L
CO2 SERPL-SCNC: 27 MMOL/L
CREAT SERPL-MCNC: 1.15 MG/DL
EGFR: 81 ML/MIN/1.73M2
POTASSIUM SERPL-SCNC: 4.7 MMOL/L
PROT SERPL-MCNC: 7.2 G/DL
SODIUM SERPL-SCNC: 143 MMOL/L
URATE SERPL-MCNC: 4.1 MG/DL

## 2023-08-25 PROCEDURE — 99213 OFFICE O/P EST LOW 20 MIN: CPT

## 2023-08-25 RX ORDER — COLCHICINE 0.6 MG/1
0.6 TABLET ORAL DAILY
Qty: 30 | Refills: 0 | Status: COMPLETED | COMMUNITY
Start: 2022-08-26 | End: 2023-08-25

## 2023-08-25 RX ORDER — COLCHICINE 0.6 MG/1
0.6 CAPSULE ORAL
Qty: 30 | Refills: 2 | Status: ACTIVE | COMMUNITY
Start: 2022-09-22 | End: 1900-01-01

## 2023-08-25 NOTE — PHYSICAL EXAM
[General Appearance - Alert] : alert [General Appearance - In No Acute Distress] : in no acute distress [Edema] : there was no peripheral edema [Abnormal Walk] : normal gait [Nail Clubbing] : no clubbing  or cyanosis of the fingernails [Musculoskeletal - Swelling] : no joint swelling seen [Motor Tone] : muscle strength and tone were normal

## 2023-08-25 NOTE — ASSESSMENT
[FreeTextEntry1] : CPK level Check below to monitor for medication related toxicity.  Check uric acid level to evaluate hyperuricemia and adjust allopurinol accordingly.  f/u 2 months, sooner PRN

## 2023-08-25 NOTE — HISTORY OF PRESENT ILLNESS
[FreeTextEntry1] : Doing well and tolerating increase in allopurinol dose.  Patient denies any side of his.  He states that he had blood work done by his PCP 2 weeks ago and reports that everything was within normal limits.  No results are available to review during this visit.  He has not had any flares but occasionally feels twinges of pain if he eats food that he is not supposed to. Resumed cholesterol medication and exercise program.

## 2023-12-01 ENCOUNTER — APPOINTMENT (OUTPATIENT)
Dept: RHEUMATOLOGY | Facility: CLINIC | Age: 43
End: 2023-12-01
Payer: COMMERCIAL

## 2023-12-01 VITALS
DIASTOLIC BLOOD PRESSURE: 85 MMHG | BODY MASS INDEX: 28.79 KG/M2 | SYSTOLIC BLOOD PRESSURE: 126 MMHG | HEIGHT: 68 IN | WEIGHT: 190 LBS | OXYGEN SATURATION: 98 % | HEART RATE: 68 BPM

## 2023-12-01 DIAGNOSIS — M10.9 GOUT, UNSPECIFIED: ICD-10-CM

## 2023-12-01 PROCEDURE — 99213 OFFICE O/P EST LOW 20 MIN: CPT

## 2023-12-08 LAB
ALBUMIN SERPL ELPH-MCNC: 4.8 G/DL
ALP BLD-CCNC: 60 U/L
ALT SERPL-CCNC: 45 U/L
ANION GAP SERPL CALC-SCNC: 14 MMOL/L
AST SERPL-CCNC: 50 U/L
BASOPHILS # BLD AUTO: 0.03 K/UL
BASOPHILS NFR BLD AUTO: 0.5 %
BILIRUB SERPL-MCNC: 0.4 MG/DL
BUN SERPL-MCNC: 16 MG/DL
CALCIUM SERPL-MCNC: 9.2 MG/DL
CHLORIDE SERPL-SCNC: 105 MMOL/L
CO2 SERPL-SCNC: 26 MMOL/L
CREAT SERPL-MCNC: 1.14 MG/DL
EGFR: 82 ML/MIN/1.73M2
EOSINOPHIL # BLD AUTO: 0.23 K/UL
EOSINOPHIL NFR BLD AUTO: 3.7 %
HCT VFR BLD CALC: 44.4 %
HGB BLD-MCNC: 15 G/DL
IMM GRANULOCYTES NFR BLD AUTO: 0.3 %
LYMPHOCYTES # BLD AUTO: 1.54 K/UL
LYMPHOCYTES NFR BLD AUTO: 24.5 %
MAN DIFF?: NORMAL
MCHC RBC-ENTMCNC: 28.9 PG
MCHC RBC-ENTMCNC: 33.8 GM/DL
MCV RBC AUTO: 85.5 FL
MONOCYTES # BLD AUTO: 0.46 K/UL
MONOCYTES NFR BLD AUTO: 7.3 %
NEUTROPHILS # BLD AUTO: 4 K/UL
NEUTROPHILS NFR BLD AUTO: 63.7 %
PLATELET # BLD AUTO: 171 K/UL
POTASSIUM SERPL-SCNC: 4.2 MMOL/L
PROT SERPL-MCNC: 6.8 G/DL
RBC # BLD: 5.19 M/UL
RBC # FLD: 14 %
SODIUM SERPL-SCNC: 146 MMOL/L
URATE SERPL-MCNC: 5.7 MG/DL
WBC # FLD AUTO: 6.28 K/UL

## 2023-12-08 RX ORDER — ALLOPURINOL 300 MG/1
300 TABLET ORAL
Qty: 30 | Refills: 2 | Status: ACTIVE | COMMUNITY
Start: 2022-09-29 | End: 1900-01-01

## 2024-02-13 ENCOUNTER — EMERGENCY (EMERGENCY)
Facility: HOSPITAL | Age: 44
LOS: 1 days | Discharge: HOME | End: 2024-02-13
Admitting: EMERGENCY MEDICINE
Payer: COMMERCIAL

## 2024-02-13 VITALS
HEART RATE: 86 BPM | RESPIRATION RATE: 15 BRPM | SYSTOLIC BLOOD PRESSURE: 125 MMHG | TEMPERATURE: 98 F | OXYGEN SATURATION: 100 % | DIASTOLIC BLOOD PRESSURE: 84 MMHG

## 2024-02-13 DIAGNOSIS — Z98.890 OTHER SPECIFIED POSTPROCEDURAL STATES: Chronic | ICD-10-CM

## 2024-02-13 PROCEDURE — 99285 EMERGENCY DEPT VISIT HI MDM: CPT | Mod: 57

## 2024-02-13 PROCEDURE — 24670 CLTX ULNAR FX PROX W/O MNPJ: CPT | Mod: 54,LT

## 2024-02-13 NOTE — ED ADULT TRIAGE NOTE - CHIEF COMPLAINT QUOTE
Pt c/o left elbow pain  radiate to hand x4 days. Endorses swelling. Pt states he was lifting 20lb weights  and hyperextended elbow after which symptoms started. Unable to bend or straighten arm. Past Medical History: Gout

## 2024-02-14 VITALS
OXYGEN SATURATION: 100 % | TEMPERATURE: 98 F | HEART RATE: 86 BPM | SYSTOLIC BLOOD PRESSURE: 128 MMHG | RESPIRATION RATE: 16 BRPM | DIASTOLIC BLOOD PRESSURE: 83 MMHG

## 2024-02-14 LAB
ANION GAP SERPL CALC-SCNC: 13 MMOL/L — SIGNIFICANT CHANGE UP (ref 7–14)
BASOPHILS # BLD AUTO: 0.04 K/UL — SIGNIFICANT CHANGE UP (ref 0–0.2)
BASOPHILS NFR BLD AUTO: 0.3 % — SIGNIFICANT CHANGE UP (ref 0–2)
BUN SERPL-MCNC: 19 MG/DL — SIGNIFICANT CHANGE UP (ref 7–23)
CALCIUM SERPL-MCNC: 9.7 MG/DL — SIGNIFICANT CHANGE UP (ref 8.4–10.5)
CHLORIDE SERPL-SCNC: 98 MMOL/L — SIGNIFICANT CHANGE UP (ref 98–107)
CO2 SERPL-SCNC: 27 MMOL/L — SIGNIFICANT CHANGE UP (ref 22–31)
CREAT SERPL-MCNC: 1.05 MG/DL — SIGNIFICANT CHANGE UP (ref 0.5–1.3)
CRP SERPL-MCNC: 50.8 MG/L — HIGH
EGFR: 91 ML/MIN/1.73M2 — SIGNIFICANT CHANGE UP
EOSINOPHIL # BLD AUTO: 0.09 K/UL — SIGNIFICANT CHANGE UP (ref 0–0.5)
EOSINOPHIL NFR BLD AUTO: 0.8 % — SIGNIFICANT CHANGE UP (ref 0–6)
ERYTHROCYTE [SEDIMENTATION RATE] IN BLOOD: 19 MM/HR — HIGH (ref 1–15)
GLUCOSE SERPL-MCNC: 121 MG/DL — HIGH (ref 70–99)
HCT VFR BLD CALC: 46.9 % — SIGNIFICANT CHANGE UP (ref 39–50)
HGB BLD-MCNC: 15.4 G/DL — SIGNIFICANT CHANGE UP (ref 13–17)
IANC: 9.15 K/UL — HIGH (ref 1.8–7.4)
IMM GRANULOCYTES NFR BLD AUTO: 0.3 % — SIGNIFICANT CHANGE UP (ref 0–0.9)
LYMPHOCYTES # BLD AUTO: 1.62 K/UL — SIGNIFICANT CHANGE UP (ref 1–3.3)
LYMPHOCYTES # BLD AUTO: 13.5 % — SIGNIFICANT CHANGE UP (ref 13–44)
MCHC RBC-ENTMCNC: 27.4 PG — SIGNIFICANT CHANGE UP (ref 27–34)
MCHC RBC-ENTMCNC: 32.8 GM/DL — SIGNIFICANT CHANGE UP (ref 32–36)
MCV RBC AUTO: 83.5 FL — SIGNIFICANT CHANGE UP (ref 80–100)
MONOCYTES # BLD AUTO: 1.04 K/UL — HIGH (ref 0–0.9)
MONOCYTES NFR BLD AUTO: 8.7 % — SIGNIFICANT CHANGE UP (ref 2–14)
NEUTROPHILS # BLD AUTO: 9.15 K/UL — HIGH (ref 1.8–7.4)
NEUTROPHILS NFR BLD AUTO: 76.4 % — SIGNIFICANT CHANGE UP (ref 43–77)
NRBC # BLD: 0 /100 WBCS — SIGNIFICANT CHANGE UP (ref 0–0)
NRBC # FLD: 0 K/UL — SIGNIFICANT CHANGE UP (ref 0–0)
PLATELET # BLD AUTO: 232 K/UL — SIGNIFICANT CHANGE UP (ref 150–400)
POTASSIUM SERPL-MCNC: 4.3 MMOL/L — SIGNIFICANT CHANGE UP (ref 3.5–5.3)
POTASSIUM SERPL-SCNC: 4.3 MMOL/L — SIGNIFICANT CHANGE UP (ref 3.5–5.3)
RBC # BLD: 5.62 M/UL — SIGNIFICANT CHANGE UP (ref 4.2–5.8)
RBC # FLD: 13.1 % — SIGNIFICANT CHANGE UP (ref 10.3–14.5)
SODIUM SERPL-SCNC: 138 MMOL/L — SIGNIFICANT CHANGE UP (ref 135–145)
URATE SERPL-MCNC: 7.3 MG/DL — SIGNIFICANT CHANGE UP (ref 3.4–8.8)
WBC # BLD: 11.98 K/UL — HIGH (ref 3.8–10.5)
WBC # FLD AUTO: 11.98 K/UL — HIGH (ref 3.8–10.5)

## 2024-02-14 PROCEDURE — 73080 X-RAY EXAM OF ELBOW: CPT | Mod: 26,LT

## 2024-02-14 PROCEDURE — 73090 X-RAY EXAM OF FOREARM: CPT | Mod: 26,LT

## 2024-02-14 PROCEDURE — 73060 X-RAY EXAM OF HUMERUS: CPT | Mod: 26,LT

## 2024-02-14 RX ORDER — MORPHINE SULFATE 50 MG/1
2 CAPSULE, EXTENDED RELEASE ORAL ONCE
Refills: 0 | Status: DISCONTINUED | OUTPATIENT
Start: 2024-02-14 | End: 2024-02-14

## 2024-02-14 RX ORDER — OXYCODONE HYDROCHLORIDE 5 MG/1
1 TABLET ORAL
Qty: 12 | Refills: 0
Start: 2024-02-14 | End: 2024-02-16

## 2024-02-14 RX ADMIN — MORPHINE SULFATE 2 MILLIGRAM(S): 50 CAPSULE, EXTENDED RELEASE ORAL at 01:49

## 2024-02-14 NOTE — ED PROVIDER NOTE - OBJECTIVE STATEMENT
43[facility] with left elbow pain x 4 days.  Patient states he was "shadowboxing "with his son when he had hyperextended his elbow when he he was hit.  Patient states he had some mild pain at that time, then went to the gym the next day and lifted weights however shortly after that his pain got significantly worse and has difficulty straightening his arm.  Patient took a leftover dose of oxycodone around 7 PM and a dose of Tylenol prior to arrival.  Gets hives to Advil.  Denies any fevers, numbness, tingling, chest pain, shortness of breath.

## 2024-02-14 NOTE — ED ADULT NURSE NOTE - OBJECTIVE STATEMENT
Pt received in intake 13. Pt alert and oriented x 3, ambulatory at baseline. Hx of gout, HLD. Pt c/o left elbow pain rated 10/10 that radiates to the hand that began 4 days ago. Pt reports lifting 20lb weights and hyperextended his elbow. Pt unable to bend or straighten his arm due to pain. Respirations even and unlabored, NAD. Pt denies chest pain, shortness of breath, N/V/D, headache, dizziness, weakness, fever, chills.  symptoms. 20G IV in the right AC, labs drawn and pt medicated per MD orders.

## 2024-02-14 NOTE — ED PROVIDER NOTE - NSFOLLOWUPINSTRUCTIONS_ED_ALL_ED_FT
Follow up with an Orthopedist, the ER will contact you for follow up. Take Tylenol as needed for pain and take Oxycodone as needed for severe or persistent pain. Continue to use the sling and splint you were given, apply ice for the next two days and continue to keep your arm elevated.  Continue all previously prescribed medications as directed.  Follow up with your primary care physician in 48-72 hours- bring copies of your results.  Return to the ER for worsening or persistent symptoms, and/or ANY NEW OR CONCERNING SYMPTOMS. THIS INCLUDES BUT IS NOT LIMITED TO SEVERE PAIN, NUMBNESS, FEVER, CHILLS, NIGHTSWEATS OR FOR ANY OTHER SYMPTOMS THAT CONCERN YOU. If you have issues obtaining follow up, please call: [phone] to obtain a doctor or specialist who takes your insurance in your area.  You may call [phone] to make an appointment with the internal medicine clinic.

## 2024-02-14 NOTE — ED PROVIDER NOTE - PATIENT PORTAL LINK FT
You can access the FollowMyHealth Patient Portal offered by [facility] by registering at the following website: [email or web address]. By joining [employer]’s FollowMyHealth portal, you will also be able to view your health information using other applications (apps) compatible with our system.

## 2024-02-14 NOTE — ED ADULT NURSE NOTE - SUICIDE SCREENING QUESTION 1
ID following   MRSA bacteremia last + culture 9/28  last positive Candida blood culture 9/30  MRSA  bacteremia and candidemia  Per IDYoni plan to treat for 6 weeks in the setting of post surgical repair of thoracic aorta dissection through 11/15/22 after which she may need chronic oral suppression with Bactrim plus fluconazole  IV vanco d/c 11/16 as per ID No
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
## 2024-02-14 NOTE — ED PROVIDER NOTE - PHYSICAL EXAMINATION
CONSTITUTIONAL: Well-appearing; well-nourished; in no apparent distress;  NECK/LYMPH: Supple;  CARD: Normal S1, S2; no murmurs, rubs, or gallops noted  RESP: Normal chest excursion with respiration; breath sounds clear and equal bilaterally; no wheezes, rhonchi, or rales noted  ABD/GI: soft, non-distended; non-tender; no palpable organomegaly, no pulsatile mass  EXT/MS: moves all extremities; + visible edema noted to L elbow. + L lateral epicondylar ttp, and medial epicondyle. no olecranon ttp. pain elicited with supination. mild ecchymoses around elbow, as well as some areas of erythema and slightly warm to touch. pulses and sensation intact.   SKIN: Normal for age and race  NEURO: Awake, alert, oriented x 3, no gross deficits, CN II-XII grossly intact, no motor or sensory deficit noted  PSYCH: Normal mood; appropriate affect CONSTITUTIONAL: Well-appearing; well-nourished; in no apparent distress;  NECK/LYMPH: Supple;  CARD: Normal S1, S2; no murmurs, rubs, or gallops noted  RESP: Normal chest excursion with respiration; breath sounds clear and equal bilaterally; no wheezes, rhonchi, or rales noted  ABD/GI: soft, non-distended; non-tender; no palpable organomegaly, no pulsatile mass  EXT/MS: moves all extremities; + visible edema noted to L elbow. + L lateral epicondylar ttp, and medial epicondyle. no olecranon ttp. pain elicited with supination. reduced ROM with extension. mild ecchymoses around elbow, as well as some areas of erythema and slightly warm to touch. pulses and sensation intact.   SKIN: Normal for age and race  NEURO: Awake, alert, oriented x 3, no gross deficits, CN II-XII grossly intact, no motor or sensory deficit noted  PSYCH: Normal mood; appropriate affect

## 2024-03-15 ENCOUNTER — APPOINTMENT (OUTPATIENT)
Dept: RHEUMATOLOGY | Facility: CLINIC | Age: 44
End: 2024-03-15

## 2024-06-06 NOTE — ED CDU PROVIDER NOTE - DISPOSITION TYPE
HPI:  Pt is a 88 yo male pmhx Afib on Xarelto, Alzheimer's Dementia (bedbound, minimally verbal), HTN, HLD, CAD, DM2, CVA, BPH, PVD, fall s/p fx L2 fx s/p L1-L4 fusion biba from home with ams.  Per family patient has been eating less over the past few days, less verbal than his barely verbal baseline.  This am they tried to feed him an ensure however about an hour or so later it was coming from his mouth (?residual vs vomiting).  In the ED patient tachycardic to the 160s, hypoxic tot he 80s placed on NRB,  febrile to 104.9F, found to be dehydrated, with and elevated lactate, troponin and bun/cr.  Patient given ~500 cc IVF (secondary to concern for chf/afib--> overload) by ED team.  Patient became hypotensive and was started on levophed.  Patient given azithromycin, ceftriaxone, IV tylenol.  Admit to ICU. Of note last dose of Xarelto was 5/30 am.     Brief Hospital Course:  Pt admitted to ICU service with septic shock secondary to urosepsis with e.coli in urine, dehydration/hypernatremia, and ALBINO. Pt requiring pressors in the setting of shock state now resolved off pressors, hemodynamically stable on midodrine. Pt completed a course of ceftriaxone. Comfortably satting well on RA. GOC discussion had with pts family members who stated that they wouldn't want him to undergo aggressive measures- cpr and intubation. Patient made DNR/DNI MOLST form filled and in chart. Palliative service consulted and family is agreeable to home with hospice. No more blood draws. Pt is pending discharge to home today with home hospice.     NEURO: Baseline dementia, aspiration precautions.  CV: shock state no resolved, off pressors. On midodrine. MAP >65. Gentle hydration as tolerated.  RESP: RA, spo2 >92%  RENAL: ALBINO likely ATN, creatinine improving. avoid nephrotoxic meds, renally dose meds. Keep wilson for comfort measures.  GI: Pureed diet  ID: e.coli (+) urine cx on 5/31, s/p course of ceftriaxone.    HEME: Xarellto for afib    DISCHARGE

## 2024-09-15 ENCOUNTER — NON-APPOINTMENT (OUTPATIENT)
Age: 44
End: 2024-09-15

## 2024-09-16 ENCOUNTER — APPOINTMENT (OUTPATIENT)
Dept: RHEUMATOLOGY | Facility: CLINIC | Age: 44
End: 2024-09-16
Payer: COMMERCIAL

## 2024-09-16 VITALS
OXYGEN SATURATION: 98 % | HEART RATE: 65 BPM | HEIGHT: 68 IN | WEIGHT: 190 LBS | BODY MASS INDEX: 28.79 KG/M2 | SYSTOLIC BLOOD PRESSURE: 122 MMHG | DIASTOLIC BLOOD PRESSURE: 85 MMHG

## 2024-09-16 DIAGNOSIS — M10.9 GOUT, UNSPECIFIED: ICD-10-CM

## 2024-09-16 PROCEDURE — 99213 OFFICE O/P EST LOW 20 MIN: CPT

## 2024-09-16 NOTE — ASSESSMENT
[FreeTextEntry1] : no sign of active flare on exam today  check labs as outlined below  resume allopurinol and colchicine OV 3 months, sooner PRN

## 2024-09-16 NOTE — PHYSICAL EXAM
[General Appearance - Alert] : alert [General Appearance - In No Acute Distress] : in no acute distress [Oriented To Time, Place, And Person] : oriented to person, place, and time [Impaired Insight] : insight and judgment were intact [Affect] : the affect was normal [Abnormal Walk] : normal gait [Nail Clubbing] : no clubbing  or cyanosis of the fingernails [Musculoskeletal - Swelling] : no joint swelling seen [Motor Tone] : muscle strength and tone were normal [Full Pulse] : the pedal pulses are present [Edema] : there was no peripheral edema

## 2024-09-16 NOTE — HISTORY OF PRESENT ILLNESS
[FreeTextEntry1] : non compliant with allopurinol--not taking daily  reports increasing pain in the right knee, intermittent.  went to orthopedics and was told that this is likely related to gout  does not drink alcohol  does not do intense exercise at this time  reports good hydration.

## 2024-09-18 LAB
ALBUMIN SERPL ELPH-MCNC: 4.8 G/DL
ALP BLD-CCNC: 68 U/L
ALT SERPL-CCNC: 35 U/L
ANION GAP SERPL CALC-SCNC: 14 MMOL/L
AST SERPL-CCNC: 24 U/L
BASOPHILS # BLD AUTO: 0.05 K/UL
BASOPHILS NFR BLD AUTO: 0.6 %
BILIRUB SERPL-MCNC: 0.3 MG/DL
BUN SERPL-MCNC: 15 MG/DL
CALCIUM SERPL-MCNC: 9.5 MG/DL
CHLORIDE SERPL-SCNC: 104 MMOL/L
CO2 SERPL-SCNC: 23 MMOL/L
CREAT SERPL-MCNC: 1.03 MG/DL
EGFR: 92 ML/MIN/1.73M2
EOSINOPHIL # BLD AUTO: 0.16 K/UL
EOSINOPHIL NFR BLD AUTO: 2.1 %
HCT VFR BLD CALC: 45.9 %
HGB BLD-MCNC: 15.1 G/DL
IMM GRANULOCYTES NFR BLD AUTO: 0.3 %
LYMPHOCYTES # BLD AUTO: 2.05 K/UL
LYMPHOCYTES NFR BLD AUTO: 26.6 %
MAN DIFF?: NORMAL
MCHC RBC-ENTMCNC: 27.7 PG
MCHC RBC-ENTMCNC: 32.9 GM/DL
MCV RBC AUTO: 84.1 FL
MONOCYTES # BLD AUTO: 0.55 K/UL
MONOCYTES NFR BLD AUTO: 7.1 %
NEUTROPHILS # BLD AUTO: 4.89 K/UL
NEUTROPHILS NFR BLD AUTO: 63.3 %
PLATELET # BLD AUTO: 189 K/UL
POTASSIUM SERPL-SCNC: 4.3 MMOL/L
PROT SERPL-MCNC: 7.5 G/DL
RBC # BLD: 5.46 M/UL
RBC # FLD: 14.1 %
SODIUM SERPL-SCNC: 141 MMOL/L
URATE SERPL-MCNC: 5.1 MG/DL
WBC # FLD AUTO: 7.72 K/UL

## 2024-12-20 ENCOUNTER — APPOINTMENT (OUTPATIENT)
Dept: RHEUMATOLOGY | Facility: CLINIC | Age: 44
End: 2024-12-20

## 2025-04-17 ENCOUNTER — EMERGENCY (EMERGENCY)
Facility: HOSPITAL | Age: 45
LOS: 1 days | End: 2025-04-17
Admitting: STUDENT IN AN ORGANIZED HEALTH CARE EDUCATION/TRAINING PROGRAM
Payer: MEDICAID

## 2025-04-17 VITALS
DIASTOLIC BLOOD PRESSURE: 86 MMHG | HEART RATE: 61 BPM | RESPIRATION RATE: 16 BRPM | SYSTOLIC BLOOD PRESSURE: 123 MMHG | WEIGHT: 192.02 LBS | OXYGEN SATURATION: 100 % | TEMPERATURE: 98 F

## 2025-04-17 VITALS
DIASTOLIC BLOOD PRESSURE: 84 MMHG | TEMPERATURE: 98 F | RESPIRATION RATE: 16 BRPM | SYSTOLIC BLOOD PRESSURE: 130 MMHG | HEART RATE: 63 BPM | OXYGEN SATURATION: 100 %

## 2025-04-17 DIAGNOSIS — Z98.890 OTHER SPECIFIED POSTPROCEDURAL STATES: Chronic | ICD-10-CM

## 2025-04-17 PROCEDURE — 99284 EMERGENCY DEPT VISIT MOD MDM: CPT

## 2025-04-17 PROCEDURE — 73140 X-RAY EXAM OF FINGER(S): CPT | Mod: 26,LT

## 2025-04-17 RX ORDER — AMOXICILLIN AND CLAVULANATE POTASSIUM 500; 125 MG/1; MG/1
1 TABLET, FILM COATED ORAL
Qty: 20 | Refills: 0
Start: 2025-04-17 | End: 2025-04-23

## 2025-04-17 RX ORDER — BACITRACIN 500 UNIT/G
1 OINTMENT (GRAM) TOPICAL
Qty: 1 | Refills: 0
Start: 2025-04-17

## 2025-06-10 ENCOUNTER — NON-APPOINTMENT (OUTPATIENT)
Age: 45
End: 2025-06-10

## 2025-06-10 ENCOUNTER — APPOINTMENT (OUTPATIENT)
Dept: UROLOGY | Facility: CLINIC | Age: 45
End: 2025-06-10
Payer: MEDICAID

## 2025-06-10 VITALS
WEIGHT: 187 LBS | HEIGHT: 68 IN | OXYGEN SATURATION: 99 % | DIASTOLIC BLOOD PRESSURE: 82 MMHG | BODY MASS INDEX: 28.34 KG/M2 | TEMPERATURE: 98 F | SYSTOLIC BLOOD PRESSURE: 119 MMHG | HEART RATE: 66 BPM

## 2025-06-10 PROBLEM — R74.8 ELEVATED LIVER ENZYMES: Status: RESOLVED | Noted: 2022-11-11 | Resolved: 2025-06-10

## 2025-06-10 PROBLEM — M79.673 PAIN, FOOT: Status: RESOLVED | Noted: 2022-09-23 | Resolved: 2025-06-10

## 2025-06-10 PROBLEM — R33.9 INCOMPLETE EMPTYING OF BLADDER: Status: ACTIVE | Noted: 2025-06-10

## 2025-06-10 PROBLEM — S93.692A OTHER SPRAIN OF LEFT FOOT: Status: RESOLVED | Noted: 2022-08-29 | Resolved: 2025-06-10

## 2025-06-10 PROBLEM — R74.8 ELEVATED CPK: Status: RESOLVED | Noted: 2022-11-13 | Resolved: 2025-06-10

## 2025-06-10 PROBLEM — M77.42 METATARSALGIA OF LEFT FOOT: Status: RESOLVED | Noted: 2022-08-29 | Resolved: 2025-06-10

## 2025-06-10 PROCEDURE — 51798 US URINE CAPACITY MEASURE: CPT

## 2025-06-10 PROCEDURE — 99204 OFFICE O/P NEW MOD 45 MIN: CPT | Mod: 25

## 2025-06-10 RX ORDER — ATORVASTATIN CALCIUM 80 MG/1
TABLET, FILM COATED ORAL
Refills: 0 | Status: DISCONTINUED | COMMUNITY
End: 2025-06-10

## 2025-06-10 RX ORDER — SILDENAFIL 20 MG/1
20 TABLET ORAL
Refills: 0 | Status: ACTIVE | COMMUNITY

## 2025-06-12 ENCOUNTER — NON-APPOINTMENT (OUTPATIENT)
Age: 45
End: 2025-06-12

## 2025-06-12 LAB
APPEARANCE: CLEAR
BILIRUBIN URINE: NEGATIVE
BLOOD URINE: NEGATIVE
COLOR: YELLOW
GLUCOSE QUALITATIVE U: NEGATIVE MG/DL
KETONES URINE: NEGATIVE MG/DL
LEUKOCYTE ESTERASE URINE: NEGATIVE
NITRITE URINE: NEGATIVE
PH URINE: 6
PROTEIN URINE: NEGATIVE MG/DL
SPECIFIC GRAVITY URINE: 1.01
UROBILINOGEN URINE: 0.2 MG/DL

## 2025-06-13 ENCOUNTER — APPOINTMENT (OUTPATIENT)
Dept: UROLOGY | Facility: CLINIC | Age: 45
End: 2025-06-13
Payer: MEDICAID

## 2025-06-13 ENCOUNTER — APPOINTMENT (OUTPATIENT)
Dept: BARIATRICS | Facility: CLINIC | Age: 45
End: 2025-06-13
Payer: MEDICAID

## 2025-06-13 VITALS
HEART RATE: 69 BPM | WEIGHT: 187 LBS | BODY MASS INDEX: 28.43 KG/M2 | DIASTOLIC BLOOD PRESSURE: 84 MMHG | TEMPERATURE: 97 F | SYSTOLIC BLOOD PRESSURE: 128 MMHG | OXYGEN SATURATION: 98 %

## 2025-06-13 PROBLEM — R10.32 LEFT INGUINAL PAIN: Status: ACTIVE | Noted: 2025-06-13

## 2025-06-13 PROCEDURE — 99214 OFFICE O/P EST MOD 30 MIN: CPT

## 2025-06-13 PROCEDURE — ZZZZZ: CPT

## 2025-06-18 ENCOUNTER — APPOINTMENT (OUTPATIENT)
Dept: CT IMAGING | Facility: IMAGING CENTER | Age: 45
End: 2025-06-18
Payer: MEDICAID

## 2025-06-18 ENCOUNTER — OUTPATIENT (OUTPATIENT)
Dept: OUTPATIENT SERVICES | Facility: HOSPITAL | Age: 45
LOS: 1 days | End: 2025-06-18
Payer: MEDICAID

## 2025-06-18 DIAGNOSIS — Z98.890 OTHER SPECIFIED POSTPROCEDURAL STATES: Chronic | ICD-10-CM

## 2025-06-18 DIAGNOSIS — R35.0 FREQUENCY OF MICTURITION: ICD-10-CM

## 2025-06-18 PROCEDURE — 74178 CT ABD&PLV WO CNTR FLWD CNTR: CPT | Mod: 26

## 2025-06-18 PROCEDURE — 74178 CT ABD&PLV WO CNTR FLWD CNTR: CPT

## 2025-06-19 ENCOUNTER — NON-APPOINTMENT (OUTPATIENT)
Age: 45
End: 2025-06-19

## 2025-06-20 ENCOUNTER — EMERGENCY (EMERGENCY)
Facility: HOSPITAL | Age: 45
LOS: 1 days | End: 2025-06-20
Admitting: EMERGENCY MEDICINE
Payer: MEDICAID

## 2025-06-20 VITALS
OXYGEN SATURATION: 98 % | HEART RATE: 70 BPM | DIASTOLIC BLOOD PRESSURE: 80 MMHG | WEIGHT: 186.95 LBS | HEIGHT: 69 IN | TEMPERATURE: 97 F | RESPIRATION RATE: 18 BRPM | SYSTOLIC BLOOD PRESSURE: 118 MMHG

## 2025-06-20 DIAGNOSIS — Z98.890 OTHER SPECIFIED POSTPROCEDURAL STATES: Chronic | ICD-10-CM

## 2025-06-20 PROCEDURE — 99284 EMERGENCY DEPT VISIT MOD MDM: CPT

## 2025-06-20 NOTE — ED ADULT TRIAGE NOTE - CHIEF COMPLAINT QUOTE
pt c/o congestion, sore throat, subjective fever and ear aches x2 days. pt denies relief from tylenol at home. no phx

## 2025-06-20 NOTE — ED ADULT TRIAGE NOTE - MODE OF ARRIVAL
Interval History: Yesterday evening one episode of palpitations, shaking. EKG showed sinus tach with nonspecific T wave abnormalities. This morning, she reported doing well and otherwise had no complaints. Plan: continue antibiotics for GNR in urine culture.    Review of Systems   Constitutional: Positive for activity change and appetite change. Negative for diaphoresis, fatigue and fever.   Respiratory: Negative for cough, chest tightness, shortness of breath, wheezing and stridor.    Cardiovascular: Positive for palpitations. Negative for chest pain and leg swelling.   Gastrointestinal: Positive for nausea. Negative for abdominal distention, abdominal pain, constipation, diarrhea and vomiting.   Genitourinary: Positive for flank pain. Negative for dysuria, frequency and hematuria.        +malodorous urine   Musculoskeletal: Negative for arthralgias, back pain, gait problem, joint swelling, myalgias, neck pain and neck stiffness.   Neurological: Negative for seizures, syncope, facial asymmetry, light-headedness and headaches.     Objective:     Vital Signs (Most Recent):  Temp: 99.4 °F (37.4 °C) (02/05/18 2002)  Pulse: 106 (02/05/18 2002)  Resp: 18 (02/05/18 2002)  BP: (!) 155/95 (02/05/18 2002)  SpO2: 100 % (02/05/18 2002) Vital Signs (24h Range):  Temp:  [98.3 °F (36.8 °C)-99.4 °F (37.4 °C)] 99.4 °F (37.4 °C)  Pulse:  [] 106  Resp:  [16-18] 18  SpO2:  [97 %-100 %] 100 %  BP: (129-155)/(74-95) 155/95     Weight: 47.6 kg (105 lb)  Body mass index is 19.84 kg/m².  No intake or output data in the 24 hours ending 02/06/18 0048     Physical Exam   Constitutional: She appears well-developed.   HENT:   Head: Normocephalic and atraumatic.   Right Ear: External ear normal.   Left Ear: External ear normal.   Nose: Nose normal.   Eyes: EOM are normal. Pupils are equal, round, and reactive to light.   Neck: No tracheal deviation present. No thyromegaly present.   Cardiovascular: Normal rate.    No murmur  heard.  Pulmonary/Chest: Effort normal and breath sounds normal.   Abdominal: Soft. There is no tenderness. No hernia.   Musculoskeletal: She exhibits no edema.   + R flank pain, no L flank pain. No increased pain with percussion of R flank   Neurological: She displays normal reflexes. No cranial nerve deficit.   Skin: No rash noted.   Psychiatric: She has a normal mood and affect. Judgment normal.   Vitals reviewed.      Significant Labs:   BMP:     Recent Labs  Lab 02/05/18 0441 02/06/18  0001   * 94    141   K 3.8 2.9*    106   CO2 27 26   BUN 7 6   CREATININE 0.7 0.6   CALCIUM 8.7 8.6*   MG 1.6  --      CBC:     Recent Labs  Lab 02/04/18  0554 02/05/18 0441 02/06/18  0001   WBC 9.15 9.69 13.44*   HGB 10.6* 11.3* 11.1*   HCT 31.3* 33.2* 33.1*    290 315     CMP:     Recent Labs  Lab 02/04/18  0554 02/05/18  0441 02/06/18  0001    142 141   K 3.7 3.8 2.9*    107 106   CO2 26 27 26   * 118* 94   BUN 6 7 6   CREATININE 0.6 0.7 0.6   CALCIUM 8.2* 8.7 8.6*   PROT 5.9* 6.2 6.5   ALBUMIN 2.8* 2.8* 3.0*   BILITOT 0.1 0.2 0.2   ALKPHOS 50* 48* 49*   AST 14 10 35   ALT 12 11 30   ANIONGAP 6* 8 9   EGFRNONAA >60.0 >60.0 >60.0     Urine Culture: No results for input(s): LABURIN in the last 48 hours.    Significant Imaging: I have reviewed all pertinent imaging results/findings within the past 24 hours.        CRANIAL NERVES     CN III, IV, VI   Pupils are equal, round, and reactive to light.  Extraocular motions are normal.        Walk in

## 2025-06-21 RX ORDER — ACETAMINOPHEN 500 MG/5ML
650 LIQUID (ML) ORAL ONCE
Refills: 0 | Status: COMPLETED | OUTPATIENT
Start: 2025-06-21 | End: 2025-06-21

## 2025-06-21 RX ORDER — ACETAMINOPHEN, GUAIFENESIN, AND PHENYLEPHRINE HYDROCHLORIDE 325; 200; 5 MG/1; MG/1; MG/1
2 TABLET, COATED ORAL
Qty: 25 | Refills: 0
Start: 2025-06-21

## 2025-06-21 RX ORDER — PSEUDOEPHEDRINE HCL 30 MG
30 TABLET ORAL ONCE
Refills: 0 | Status: COMPLETED | OUTPATIENT
Start: 2025-06-21 | End: 2025-06-21

## 2025-06-21 RX ORDER — AMOXICILLIN AND CLAVULANATE POTASSIUM 500; 125 MG/1; MG/1
1 TABLET, FILM COATED ORAL
Qty: 14 | Refills: 0
Start: 2025-06-21

## 2025-06-21 RX ORDER — ACETAMINOPHEN 500 MG/5ML
2 LIQUID (ML) ORAL
Qty: 25 | Refills: 0
Start: 2025-06-21

## 2025-06-21 RX ORDER — DEXAMETHASONE 0.5 MG/1
6 TABLET ORAL ONCE
Refills: 0 | Status: COMPLETED | OUTPATIENT
Start: 2025-06-21 | End: 2025-06-21

## 2025-06-21 RX ORDER — AMOXICILLIN AND CLAVULANATE POTASSIUM 500; 125 MG/1; MG/1
1 TABLET, FILM COATED ORAL ONCE
Refills: 0 | Status: COMPLETED | OUTPATIENT
Start: 2025-06-21 | End: 2025-06-21

## 2025-06-21 RX ADMIN — Medication 650 MILLIGRAM(S): at 01:37

## 2025-06-21 RX ADMIN — Medication 30 MILLIGRAM(S): at 01:38

## 2025-06-21 RX ADMIN — DEXAMETHASONE 6 MILLIGRAM(S): 0.5 TABLET ORAL at 01:39

## 2025-06-21 RX ADMIN — AMOXICILLIN AND CLAVULANATE POTASSIUM 1 TABLET(S): 500; 125 TABLET, FILM COATED ORAL at 01:38

## 2025-06-21 NOTE — ED ADULT NURSE NOTE - OBJECTIVE STATEMENT
Pt is A+O4 and ambulatory at baseline. Pt c/o congestion, sore throat, fever, and ear aches x 2 days. Denies chest pain, SOB, n/v, dizziness. Speech is clear, pt able tos speak in full sentences. Spontaneous respirations are even and unlabored on room air, no acute distress noted. Medicated as prescribed.

## 2025-06-21 NOTE — ED PROVIDER NOTE - OBJECTIVE STATEMENT
patient is a 45-year-old male, no significant past medical history presenting with complaint of subjective fever and chills, right-sided ear ache, sore throat x 2 days no associated.  Chest pain, shortness of breath, nausea, vomiting, headache, dizziness.

## 2025-06-21 NOTE — ED PROVIDER NOTE - PATIENT PORTAL LINK FT
You can access the FollowMyHealth Patient Portal offered by [facility] by registering at the following website: [email or web address]. By joining [employer]’s FollowMyHealth portal, you will also be able to view your health information using other applications (apps) compatible with our system.

## 2025-06-21 NOTE — ED PROVIDER NOTE - CLINICAL SUMMARY MEDICAL DECISION MAKING FREE TEXT BOX
patient is a 45-year-old male, no significant past medical history presenting with complaint of subjective fever and chills, right-sided ear ache, sore throat x 2 days. On presentation patient nontoxic-appearing, vital stable, on exam TM slightly erythematous with fluid level.  Symptoms likely due to otitis media.  Patient will be treated with analgesics, antibiotics.  Patient advised to follow-up with PCP for continued management.

## 2025-06-21 NOTE — ED PROVIDER NOTE - ENMT, MLM
right ear: TM slightly erythematous, with fluid level. left ear. WNL Airway patent, Nasal mucosa clear. Mouth with normal mucosa. Throat has no vesicles, no oropharyngeal exudates and uvula is midline.

## 2025-06-24 ENCOUNTER — APPOINTMENT (OUTPATIENT)
Dept: UROLOGY | Facility: CLINIC | Age: 45
End: 2025-06-24
Payer: MEDICAID

## 2025-06-24 VITALS
TEMPERATURE: 97.7 F | HEART RATE: 87 BPM | HEIGHT: 68 IN | BODY MASS INDEX: 28.34 KG/M2 | SYSTOLIC BLOOD PRESSURE: 105 MMHG | OXYGEN SATURATION: 97 % | DIASTOLIC BLOOD PRESSURE: 61 MMHG | WEIGHT: 187 LBS

## 2025-06-24 PROBLEM — R39.15 URINARY URGENCY: Status: ACTIVE | Noted: 2025-06-10

## 2025-06-24 PROBLEM — N52.8 OTHER MALE ERECTILE DYSFUNCTION: Status: ACTIVE | Noted: 2025-06-10

## 2025-06-24 PROBLEM — R35.0 URINARY FREQUENCY: Status: ACTIVE | Noted: 2025-06-10

## 2025-06-24 PROBLEM — R39.9 LOWER URINARY TRACT SYMPTOMS (LUTS): Status: ACTIVE | Noted: 2025-06-10

## 2025-06-24 PROCEDURE — 51741 ELECTRO-UROFLOWMETRY FIRST: CPT

## 2025-06-24 PROCEDURE — 99214 OFFICE O/P EST MOD 30 MIN: CPT | Mod: 25

## 2025-06-24 PROCEDURE — 51798 US URINE CAPACITY MEASURE: CPT

## 2025-06-25 LAB
APPEARANCE: CLEAR
BILIRUBIN URINE: NEGATIVE
BLOOD URINE: NEGATIVE
COLOR: YELLOW
GLUCOSE QUALITATIVE U: NEGATIVE MG/DL
KETONES URINE: NEGATIVE MG/DL
LEUKOCYTE ESTERASE URINE: NEGATIVE
NITRITE URINE: NEGATIVE
PH URINE: 5.5
PROTEIN URINE: NEGATIVE MG/DL
SPECIFIC GRAVITY URINE: 1.02
UROBILINOGEN URINE: 0.2 MG/DL

## 2025-06-30 ENCOUNTER — APPOINTMENT (OUTPATIENT)
Dept: BARIATRICS | Facility: CLINIC | Age: 45
End: 2025-06-30

## 2025-07-07 ENCOUNTER — APPOINTMENT (OUTPATIENT)
Dept: BARIATRICS | Facility: CLINIC | Age: 45
End: 2025-07-07
Payer: MEDICAID

## 2025-07-07 VITALS
HEIGHT: 68 IN | RESPIRATION RATE: 16 BRPM | SYSTOLIC BLOOD PRESSURE: 107 MMHG | DIASTOLIC BLOOD PRESSURE: 67 MMHG | BODY MASS INDEX: 29.58 KG/M2 | OXYGEN SATURATION: 97 % | TEMPERATURE: 97.4 F | HEART RATE: 80 BPM | WEIGHT: 195.19 LBS

## 2025-07-07 PROCEDURE — 99205 OFFICE O/P NEW HI 60 MIN: CPT

## 2025-07-23 ENCOUNTER — APPOINTMENT (OUTPATIENT)
Dept: UROLOGY | Facility: CLINIC | Age: 45
End: 2025-07-23

## 2025-07-29 ENCOUNTER — APPOINTMENT (OUTPATIENT)
Dept: SURGERY | Facility: CLINIC | Age: 45
End: 2025-07-29

## 2025-08-05 ENCOUNTER — APPOINTMENT (OUTPATIENT)
Dept: SURGICAL ONCOLOGY | Facility: CLINIC | Age: 45
End: 2025-08-05
Payer: MEDICAID

## 2025-08-05 VITALS
HEART RATE: 79 BPM | BODY MASS INDEX: 29.1 KG/M2 | SYSTOLIC BLOOD PRESSURE: 120 MMHG | DIASTOLIC BLOOD PRESSURE: 80 MMHG | TEMPERATURE: 98.3 F | HEIGHT: 68 IN | OXYGEN SATURATION: 99 % | WEIGHT: 192 LBS

## 2025-08-05 PROCEDURE — 99245 OFF/OP CONSLTJ NEW/EST HI 55: CPT

## 2025-09-23 PROBLEM — N52.9 ERECTILE DYSFUNCTION OF ORGANIC ORIGIN: Status: ACTIVE | Noted: 2025-09-23

## 2025-09-23 PROBLEM — N40.0 BPH (BENIGN PROSTATIC HYPERPLASIA): Status: ACTIVE | Noted: 2025-09-23

## 2025-09-24 LAB
APPEARANCE: CLEAR
BILIRUBIN URINE: NEGATIVE
BLOOD URINE: NEGATIVE
COLOR: YELLOW
GLUCOSE QUALITATIVE U: NEGATIVE MG/DL
KETONES URINE: NEGATIVE MG/DL
LEUKOCYTE ESTERASE URINE: NEGATIVE
NITRITE URINE: NEGATIVE
PH URINE: 6
PROTEIN URINE: NEGATIVE MG/DL
SPECIFIC GRAVITY URINE: 1.02
UROBILINOGEN URINE: 0.2 MG/DL

## (undated) DEVICE — SUT VICRYL 4-0 27" SH UNDYED

## (undated) DEVICE — WARMING BLANKET FULL ADULT

## (undated) DEVICE — SUT PROLENE 4-0 18" PS-2

## (undated) DEVICE — DRSG STERISTRIPS 0.5 X 4"

## (undated) DEVICE — TOURNIQUET ESMARK 4"

## (undated) DEVICE — SAW BLADE MICROAIRE SAGITTAL 9.5MMX25.4MMX0.6MM

## (undated) DEVICE — DRSG MASTISOL

## (undated) DEVICE — FOR-ESU VALLEYLAB T6D04554DX: Type: DURABLE MEDICAL EQUIPMENT

## (undated) DEVICE — SUT VICRYL PLUS 2-0 27" SH UNDYED

## (undated) DEVICE — PACK ORTHO

## (undated) DEVICE — SUT VICRYL 3-0 27" SH UNDYED

## (undated) DEVICE — FOR-TOURNIQUET 27679911: Type: DURABLE MEDICAL EQUIPMENT

## (undated) DEVICE — DRAPE TOWEL BLUE 17" X 24"

## (undated) DEVICE — GLV 7.5 PROTEXIS (WHITE)

## (undated) DEVICE — DRSG ADAPTIC 3 X 8"

## (undated) DEVICE — DRAPE 3/4 SHEET W REINFORCEMENT 56X77"

## (undated) DEVICE — STAPLER SKIN MULTI DIRECTION W35

## (undated) DEVICE — DRSG COBAN 4"

## (undated) DEVICE — DRSG GAUZE MOISTURIZER 0.5 OZ 4X8

## (undated) DEVICE — VENODYNE/SCD SLEEVE CALF MEDIUM